# Patient Record
Sex: MALE | Race: WHITE | NOT HISPANIC OR LATINO | Employment: UNEMPLOYED | URBAN - METROPOLITAN AREA
[De-identification: names, ages, dates, MRNs, and addresses within clinical notes are randomized per-mention and may not be internally consistent; named-entity substitution may affect disease eponyms.]

---

## 2019-04-20 ENCOUNTER — HOSPITAL ENCOUNTER (EMERGENCY)
Facility: HOSPITAL | Age: 44
Discharge: HOME | End: 2019-04-20
Attending: EMERGENCY MEDICINE

## 2019-04-20 VITALS
BODY MASS INDEX: 25.73 KG/M2 | HEIGHT: 72 IN | OXYGEN SATURATION: 95 % | TEMPERATURE: 97.5 F | DIASTOLIC BLOOD PRESSURE: 83 MMHG | SYSTOLIC BLOOD PRESSURE: 128 MMHG | WEIGHT: 190 LBS | HEART RATE: 94 BPM | RESPIRATION RATE: 18 BRPM

## 2019-04-20 DIAGNOSIS — T40.1X1A HEROIN OVERDOSE (HCC): Primary | ICD-10-CM

## 2019-04-20 PROCEDURE — 99284 EMERGENCY DEPT VISIT MOD MDM: CPT | Performed by: EMERGENCY MEDICINE

## 2019-04-20 PROCEDURE — 93005 ELECTROCARDIOGRAM TRACING: CPT

## 2019-04-20 PROCEDURE — 99284 EMERGENCY DEPT VISIT MOD MDM: CPT

## 2019-04-20 RX ORDER — NALOXONE HYDROCHLORIDE 1 MG/ML
1 INJECTION PARENTERAL ONCE
Status: COMPLETED | OUTPATIENT
Start: 2019-04-20 | End: 2019-04-20

## 2019-04-20 RX ORDER — IBUPROFEN 600 MG/1
600 TABLET ORAL ONCE
Status: DISCONTINUED | OUTPATIENT
Start: 2019-04-20 | End: 2019-04-20 | Stop reason: HOSPADM

## 2019-04-20 RX ORDER — ONDANSETRON 2 MG/ML
INJECTION INTRAMUSCULAR; INTRAVENOUS
Status: DISCONTINUED
Start: 2019-04-20 | End: 2019-04-20 | Stop reason: HOSPADM

## 2019-04-20 RX ORDER — ONDANSETRON 2 MG/ML
1 INJECTION INTRAMUSCULAR; INTRAVENOUS ONCE
Status: COMPLETED | OUTPATIENT
Start: 2019-04-20 | End: 2019-04-20

## 2019-04-20 RX ORDER — ACETAMINOPHEN 325 MG/1
650 TABLET ORAL ONCE
Status: DISCONTINUED | OUTPATIENT
Start: 2019-04-20 | End: 2019-04-20 | Stop reason: HOSPADM

## 2019-04-22 LAB
ATRIAL RATE: 83 BPM
P AXIS: 56 DEGREES
PR INTERVAL: 142 MS
QRS AXIS: 56 DEGREES
QRSD INTERVAL: 96 MS
QT INTERVAL: 384 MS
QTC INTERVAL: 451 MS
T WAVE AXIS: 57 DEGREES
VENTRICULAR RATE: 83 BPM

## 2019-04-22 PROCEDURE — 93010 ELECTROCARDIOGRAM REPORT: CPT | Performed by: INTERNAL MEDICINE

## 2019-06-28 ENCOUNTER — HOSPITAL ENCOUNTER (EMERGENCY)
Facility: HOSPITAL | Age: 44
Discharge: HOME | End: 2019-06-29
Attending: EMERGENCY MEDICINE | Admitting: EMERGENCY MEDICINE

## 2019-06-28 DIAGNOSIS — F32.A DEPRESSION: Primary | ICD-10-CM

## 2019-06-28 LAB
AMPHETAMINES SERPL QL SCN: POSITIVE
BARBITURATES UR QL: NEGATIVE
BENZODIAZ UR QL: NEGATIVE
COCAINE UR QL: POSITIVE
ETHANOL EXG-MCNC: 0 MG/DL
METHADONE UR QL: NEGATIVE
OPIATES UR QL SCN: POSITIVE
PCP UR QL: NEGATIVE
THC UR QL: NEGATIVE

## 2019-06-28 PROCEDURE — 99284 EMERGENCY DEPT VISIT MOD MDM: CPT

## 2019-06-28 PROCEDURE — 80307 DRUG TEST PRSMV CHEM ANLYZR: CPT | Performed by: EMERGENCY MEDICINE

## 2019-06-28 PROCEDURE — 82075 ASSAY OF BREATH ETHANOL: CPT | Performed by: EMERGENCY MEDICINE

## 2019-06-28 RX ORDER — NICOTINE 21 MG/24HR
21 PATCH, TRANSDERMAL 24 HOURS TRANSDERMAL ONCE
Status: DISCONTINUED | OUTPATIENT
Start: 2019-06-28 | End: 2019-06-29 | Stop reason: HOSPADM

## 2019-06-28 RX ADMIN — NICOTINE 21 MG: 21 PATCH, EXTENDED RELEASE TRANSDERMAL at 20:22

## 2019-06-28 NOTE — ED NOTES
1 pt belonging bag and 1 red backpack placed in zone 5 med room on counter to the right of the sink d/t backpack being too large to fit in cabinets  Pt changed into paper scrubs and provided a urine sample at this time  Pt calm and cooperative, will continue to monitor via continual observation 1:1       Julee Javier  06/28/19 1947

## 2019-06-29 VITALS
TEMPERATURE: 98.5 F | DIASTOLIC BLOOD PRESSURE: 86 MMHG | SYSTOLIC BLOOD PRESSURE: 139 MMHG | WEIGHT: 165 LBS | BODY MASS INDEX: 22.38 KG/M2 | OXYGEN SATURATION: 96 % | HEART RATE: 82 BPM | RESPIRATION RATE: 18 BRPM

## 2019-06-29 PROCEDURE — 99283 EMERGENCY DEPT VISIT LOW MDM: CPT | Performed by: EMERGENCY MEDICINE

## 2019-06-29 NOTE — ED NOTES
Pt finished pre screen for [facility], pt attempted to contact number provided and was not connected to to right person, was told to call back in 10 minutes    Pt calm cooperative and offers no complaints     Yeny Love RN  06/29/19 3048

## 2019-06-29 NOTE — ED NOTES
Crisis Worker ([id number] [email or web address]) received phone call from Siomara Pettit from   [facility] who stated that there is a detox bed available at [facility] and asked if patient (Pt) could call there do complete a prescreen  CW gave Pt a phone and connected him to [facility] to complete a prescreen

## 2019-06-29 NOTE — ED NOTES
Pt provided  personal care items and orange juice    Pt awaiting call back to [facility] Cathy, RN  06/29/19 6496

## 2019-06-29 NOTE — ED NOTES
Pt stated that [facility] has no beds until Monday  Pt stated that he wants to be discharged  Pt stated that he is not suicidal and came in looking for help  CW offered pt to sign a 201 and go to inpatient mental health and then rehab and Pt declined and stated that he is not suicidal and wants to go home  CW talked with ED Drs who stated they are ok with Pt being discharged home  CW gave Pt discharge and drug and alcohol resources and told him that HOST will follow up with him when a detox bed becomes available  CW let a message on HOST voicemail asking for detox search to continue and reach Pt on cell phone as he has been discharged

## 2019-06-29 NOTE — ED PROVIDER NOTES
History  Chief Complaint   Patient presents with    Psychiatric Evaluation     Pt states he relapsed on heroin, states he is having crazy thoughts "making him want to kill himself"  States he has been thinking of jumping off of a bridge, but he would like to get help     26-year-old man history of cocaine abuse heroin abuse pride predominantly heroin  Has been using heroin for 20 years however he did quit he had a relapse 3 months ago and has been using daily since then most recently as today  Has a history of severe withdrawal symptoms  He has got kicked out of his house lost his family over this with recent relapse he is staying with random people random places  He says he has depressed because what happened to him he wants to check in for health a detox and rehab  He has thought about suicide he has a history of attempts with purposeful overdose  He states that he does not want to die but he is considered this again because he is embarrassed about what he has done  Has no homicidal ideation  His history seems to suggest that his predominant motor treatment should be rehab          None       History reviewed  No pertinent past medical history  History reviewed  No pertinent surgical history  History reviewed  No pertinent family history  I have reviewed and agree with the history as documented  Social History     Tobacco Use    Smoking status: Current Every Day Smoker     Packs/day: 0 50     Types: Cigarettes    Smokeless tobacco: Never Used   Substance Use Topics    Alcohol use: Not Currently     Comment: social    Drug use: Yes     Types: Heroin        Review of Systems   Constitutional: Negative for activity change, chills, diaphoresis, fatigue and fever  HENT: Negative for congestion, postnasal drip, rhinorrhea, sneezing, sore throat and trouble swallowing  Eyes: Negative for visual disturbance  Respiratory: Negative for cough, chest tightness, shortness of breath and wheezing  Cardiovascular: Negative for chest pain and leg swelling  Gastrointestinal: Negative for abdominal distention, abdominal pain, blood in stool, constipation, diarrhea, nausea and vomiting  Genitourinary: Negative for decreased urine volume, dysuria, flank pain, frequency, hematuria and urgency  Skin: Negative for color change and rash  Neurological: Negative for syncope, weakness, light-headedness and headaches  Psychiatric/Behavioral: Positive for suicidal ideas  Negative for confusion and sleep disturbance  All other systems reviewed and are negative  Physical Exam  ED Triage Vitals   Temperature Pulse Respirations Blood Pressure SpO2   06/28/19 1919 06/28/19 1925 06/28/19 1925 06/28/19 1925 06/28/19 1925   98 5 °F (36 9 °C) (!) 110 18 147/91 98 %      Temp Source Heart Rate Source Patient Position - Orthostatic VS BP Location FiO2 (%)   06/28/19 1919 06/28/19 1925 06/28/19 1925 06/28/19 1925 --   Oral Monitor Lying Right arm       Pain Score       06/28/19 1925       No Pain             Orthostatic Vital Signs  Vitals:    06/28/19 1925 06/29/19 0215 06/29/19 1013   BP: 147/91 132/71 139/86   Pulse: (!) 110 92 82   Patient Position - Orthostatic VS: Lying Lying Lying       Physical Exam   Constitutional: He is oriented to person, place, and time  He appears well-developed and well-nourished  No distress  HENT:   Head: Normocephalic and atraumatic  Nose: Nose normal    Eyes: Pupils are equal, round, and reactive to light  Conjunctivae and EOM are normal  No scleral icterus  Neck: Normal range of motion  Neck supple  No tracheal deviation present  Cardiovascular: Normal rate, regular rhythm, normal heart sounds and intact distal pulses  No murmur heard  Pulmonary/Chest: Effort normal and breath sounds normal  No stridor  No respiratory distress  He has no wheezes  Abdominal: Soft  Bowel sounds are normal  He exhibits no distension  There is no tenderness  There is no guarding  Musculoskeletal: Normal range of motion  He exhibits no edema, tenderness or deformity  Neurological: He is alert and oriented to person, place, and time  No cranial nerve deficit or sensory deficit  He exhibits normal muscle tone  Skin: Skin is warm and dry  Capillary refill takes less than 2 seconds  He is not diaphoretic  Psychiatric:   Patient tearful   Nursing note and vitals reviewed  ED Medications  Medications - No data to display    Diagnostic Studies  Results Reviewed     Procedure Component Value Units Date/Time    Rapid drug screen, urine [[id number]]  (Abnormal) Collected:  06/28/19 1942    Lab Status:  Final result Specimen:  Urine, Clean Catch Updated:  06/28/19 2013     Amph/Meth UR Positive     Barbiturate Ur Negative     Benzodiazepine Urine Negative     Cocaine Urine Positive     Methadone Urine Negative     Opiate Urine Positive     PCP Ur Negative     THC Urine Negative    Narrative:       Presumptive report  If requested, specimen will be sent to reference lab for confirmation  FOR MEDICAL PURPOSES ONLY  IF CONFIRMATION NEEDED PLEASE CONTACT THE LAB WITHIN 5 DAYS      Drug Screen Cutoff Levels:  AMPHETAMINE/METHAMPHETAMINES  1000 ng/mL  BARBITURATES     200 ng/mL  BENZODIAZEPINES     200 ng/mL  COCAINE      300 ng/mL  METHADONE      300 ng/mL  OPIATES      300 ng/mL  PHENCYCLIDINE     25 ng/mL  THC       50 ng/mL      POCT alcohol breath test [[id number]]  (Normal) Resulted:  06/28/19 1942    Lab Status:  Final result Updated:  06/28/19 1942     EXTBreath Alcohol 0 000                 No orders to display         Procedures  Procedures        ED Course                               MDM  Number of Diagnoses or Management Options  Depression:   Diagnosis management comments: Crisis consulted    Host evaluated    No available beds until Monday      Disposition  Final diagnoses:   Depression     Time reflects when diagnosis was documented in both MDM as applicable and the Disposition within this note     Time User Action Codes Description Comment    6/29/2019 10:39 AM Alvinochristen Lore Add [F32 9] Depression       ED Disposition     ED Disposition Condition Date/Time Comment    Discharge Stable Sat Jun 29, 2019 10:39 AM Miranda Cooper discharge to home/self care  MD Documentation      Most Recent Value   Sending MD Dr Tanika Harvey up With Specialties Details Why Contact Info Additional Information    [employer]  Schedule an appointment as soon as possible for a visit in 1 day  [address] [place] [address] Emergency Department Emergency Medicine Go to  If symptoms worsen, As needed [address] [place] [address] ED, [address], [address]          There are no discharge medications for this patient  No discharge procedures on file  ED Provider  Attending physically available and evaluated Miranda Cooper I managed the patient along with the ED Attending      Electronically Signed by         Linda Ovalles MD  06/29/19 0046

## 2019-06-29 NOTE — ED NOTES
HOST completed assessment with patient, who meets criteria for detox placement  Currently no detox beds available but HOST will continue to call for placement  HOST was concerned with patient's suicidal thoughts due to him stating if he were to leave he would not be safe  Spoke with Dr Marilou Lawson who is agreeable to hold patient in ED overnight for HOST to place tomorrow  Patient is agreeable with this plan       NICKI Kumar  06/28/19   [id number]

## 2019-06-29 NOTE — ED ATTENDING ATTESTATION
Shari Downs MD, saw and evaluated the patient  I have discussed the patient with the resident/non-physician practitioner and agree with the resident's/non-physician practitioner's findings, Plan of Care, and MDM as documented in the resident's/non-physician practitioner's note, except where noted  All available labs and Radiology studies were reviewed  I was present for key portions of any procedure(s) performed by the resident/non-physician practitioner and I was immediately available to provide assistance  At this point I agree with the current assessment done in the Emergency Department  I have conducted an independent evaluation of this patient a history and physical is as follows:      Critical Care Time  Procedures     44 yo male with hx of heroin and cocaine abuse, quit and relapsed with last use yesterday  Pt with si due to depression  Pt with no alcohol use  Pt with no hi, no hallucinations, no withdrawal symptoms  Vss, afebrile, lungs cta, rrr, abdomen soft nontender, no neuro deficits   Uds, bat, crisis, host

## 2019-06-29 NOTE — ED NOTES
Per CW and MD pt does not need continual observation at this time  Will continue to monitor via 4 random checks per hour        [facility]  06/28/19 2049

## 2019-06-29 NOTE — ED NOTES
Pt denies any SI at this time, pt states just needs rehab    [place] from crisis aware and physician aware     Chhaya Harper, SHANA  06/29/19 8818

## 2019-06-29 NOTE — ED NOTES
HOST contacted to request assessment for detox placement; someone should be out shortly to meet with patient       NICKI Vinson  06/28/19   0063

## 2019-06-29 NOTE — ED NOTES
Patient breakfast tray is ordered at this time- finger foods     Lili Boudreaux, SHANA  06/29/19 5615

## 2019-06-29 NOTE — ED NOTES
Pt is a 45 y o  male who was brought to the ED requesting detox placement for Heroin use  Patient was sober for 2 5 years prior to relapsing about 3 months ago  Since that time, patient has been using via IV daily; he reports use of at least 5 bags a day but typically about 7  Patient occassionally uses cocaine or meth, but reports this is when he is high and it is around  Patient has been in detox/rehab placement in the past  He denies any outpatient or supports  Patient states that for about two weeks he has been bouncing around with friends, but is currently homeless  He has lost a lot of supports once they have learned that he relapsed  Patient's boss found out as well and told him he cannot work until he's sober due to being a liability  Patient appears sincere in desire to stop using and recognizes that he needs to do so before he loses his job entirely and then has nothing  Patient states that for the past few weeks he has been having suicidal thoughts, however the past few days they have become so severe that he doesn't feel safe being alone  Patient reports having a plan to overdose, however denies any intent or desire to do so  Patient is tearful and describes that he doesn't want to hurt himself, he just feels hopeless and doesn't want to use any more  Patient talked with some friends about these feelings and was recommended to come to the ED because he didn't know where else to go to get help  Patient has no prior mental health treatment history  Patient denies any homicidal ideations and auditory/visual hallucinations  Patient is agreeable to meeting with HOST for possible detox placement        Chief Complaint   Patient presents with    Psychiatric Evaluation     Pt states he relapsed on heroin, states he is having crazy thoughts "making him want to kill himself"  States he has been thinking of jumping off of a bridge, but he would like to get help     Intake Assessment completed, Safety risk Assessment completed    Delfin Victor [place]  06/28/19 2047

## 2019-08-28 ENCOUNTER — HOSPITAL ENCOUNTER (EMERGENCY)
Facility: HOSPITAL | Age: 44
Discharge: HOME | End: 2019-08-28
Attending: EMERGENCY MEDICINE | Admitting: EMERGENCY MEDICINE

## 2019-08-28 VITALS
BODY MASS INDEX: 24.41 KG/M2 | WEIGHT: 180 LBS | SYSTOLIC BLOOD PRESSURE: 115 MMHG | OXYGEN SATURATION: 97 % | TEMPERATURE: 97.4 F | RESPIRATION RATE: 17 BRPM | DIASTOLIC BLOOD PRESSURE: 79 MMHG | HEART RATE: 90 BPM

## 2019-08-28 DIAGNOSIS — T40.1X1A HEROIN OVERDOSE (HCC): ICD-10-CM

## 2019-08-28 DIAGNOSIS — F11.10 HEROIN ABUSE (HCC): Primary | ICD-10-CM

## 2019-08-28 PROCEDURE — 99284 EMERGENCY DEPT VISIT MOD MDM: CPT

## 2019-08-28 PROCEDURE — 99282 EMERGENCY DEPT VISIT SF MDM: CPT | Performed by: EMERGENCY MEDICINE

## 2019-08-28 NOTE — ED PROVIDER NOTES
History  Chief Complaint   Patient presents with    Heroin Overdose - Accidental     Brought in via EMS  Found unresponsive given 4mg Narcan nasally, and 2mg IV  Patient A&O on arrival  Admits to shooting up 1 bag of heroine today  21-year-old gentleman presents status post accidental overdose of heroin  Reports that he is his long history of heroin abuse and had been clean for quite some time  He recently back using and reports injecting one bag today  He was in a band from and was found unresponsive  He was given 4 mg Narcan intranasally followed by 2 mg IV by EMS  After this patient has remained awake, alert, oriented  Denies any concurrent drug or alcohol abuse  He denies any past medical history or other acute issues or concerns  Declines any option of rehab at this point time  Overdose - Accidental   Ingested substance:  Illicit drugs  Suspected agents: heroin  Witnesses present: no    Incident location: public bathroom  Context: recreational    Associated symptoms: altered mental status (resolved), lethargy (resolved) and unresponsiveness (resolved)    Associated symptoms: no abdominal pain, no agitation, no anxiety, no chest pain, no cough, no diaphoresis, no diarrhea, no headaches, no nausea, no shortness of breath, no slurred speech, no visual changes and no vomiting        None       History reviewed  No pertinent past medical history  History reviewed  No pertinent surgical history  History reviewed  No pertinent family history  I have reviewed and agree with the history as documented      Social History     Tobacco Use    Smoking status: Current Every Day Smoker     Packs/day: 0 50     Types: Cigarettes    Smokeless tobacco: Never Used   Substance Use Topics    Alcohol use: Not Currently     Comment: social    Drug use: Yes     Types: Heroin     Comment: used 1 bag today        Review of Systems   Constitutional: Negative for activity change, chills, diaphoresis, fatigue and fever  HENT: Negative  Negative for congestion, postnasal drip, rhinorrhea, sinus pain, sore throat and trouble swallowing  Eyes: Negative  Respiratory: Negative  Negative for cough and shortness of breath  Cardiovascular: Negative for chest pain  Gastrointestinal: Negative for abdominal pain, constipation, diarrhea, nausea and vomiting  Endocrine: Negative  Genitourinary: Negative  Musculoskeletal: Negative  Negative for arthralgias, back pain and myalgias  Skin: Negative  Allergic/Immunologic: Negative  Neurological: Negative  Negative for headaches  Hematological: Negative  Psychiatric/Behavioral: Negative for agitation and suicidal ideas  Physical Exam  Physical Exam   Constitutional: He is oriented to person, place, and time  He appears well-developed and well-nourished  No distress  HENT:   Head: Normocephalic  Nose: Nose normal    Mouth/Throat: Oropharynx is clear and moist    Eyes: Pupils are equal, round, and reactive to light  Conjunctivae are normal    Pupils are 4-5 mm bilaterally   Neck: Neck supple  Cardiovascular: Normal rate, regular rhythm and normal heart sounds  Pulmonary/Chest: Effort normal and breath sounds normal  No respiratory distress  Abdominal: Soft  Bowel sounds are normal  He exhibits no distension  There is no tenderness  There is no guarding  Musculoskeletal: Normal range of motion  He exhibits no edema  Neurological: He is alert and oriented to person, place, and time  Skin: Skin is warm and dry  Capillary refill takes less than 2 seconds  He is not diaphoretic  Psychiatric: He has a normal mood and affect  His behavior is normal    Nursing note and vitals reviewed        Vital Signs  ED Triage Vitals [08/28/19 1627]   Temp Pulse Respirations Blood Pressure SpO2   -- 96 21 125/77 98 %      Temp src Heart Rate Source Patient Position - Orthostatic VS BP Location FiO2 (%)   -- Monitor Lying Left arm --      Pain Score       No Pain           Vitals:    08/28/19 1627   BP: 125/77   Pulse: 96   Patient Position - Orthostatic VS: Lying         Visual Acuity      ED Medications  Medications - No data to display    Diagnostic Studies  Results Reviewed     None                 No orders to display              Procedures  Procedures       ED Course                               MDM  Number of Diagnoses or Management Options  Heroin abuse [facility]):   Heroin overdose [facility]):   Diagnosis management comments: 61-year-old gentleman presents status post accidental overdose and it is the present today his history chronic heroin abuse declines any option rehab at this point  States he awakened this his own discharge patient fully awake received the field  Denies any acute issues or concerns  He has advised against the use illicit drugs or follow-up as necessary      Disposition  Final diagnoses:   Heroin abuse ([facility] [address] )   Heroin overdose ([facility] [address] )     Time reflects when diagnosis was documented in both MDM as applicable and the Disposition within this note     Time User Action Codes Description Comment    8/28/2019  4:27 PM Dunn Books Add [F11 10] Heroin abuse ([facility] [address] )     8/28/2019  4:27 PM Jeannie Barber, [address] Heroin overdose [facility])       ED Disposition     ED Disposition Condition Date/Time Comment    Discharge Stable Wed Aug 28, 2019  4:27 PM Sol Thornton discharge to home/self care  Follow-up Information    None         Patient's Medications    No medications on file     No discharge procedures on file      ED Provider  Electronically Signed by           Arabella Guzmán DO  08/28/19 0009

## 2019-09-17 ENCOUNTER — HOSPITAL ENCOUNTER (EMERGENCY)
Facility: HOSPITAL | Age: 44
Discharge: HOME | End: 2019-09-17
Attending: EMERGENCY MEDICINE | Admitting: EMERGENCY MEDICINE
Payer: COMMERCIAL

## 2019-09-17 ENCOUNTER — APPOINTMENT (EMERGENCY)
Dept: RADIOLOGY | Facility: HOSPITAL | Age: 44
End: 2019-09-17
Payer: COMMERCIAL

## 2019-09-17 VITALS
RESPIRATION RATE: 16 BRPM | OXYGEN SATURATION: 98 % | WEIGHT: 189.15 LBS | DIASTOLIC BLOOD PRESSURE: 71 MMHG | HEART RATE: 66 BPM | TEMPERATURE: 98.7 F | SYSTOLIC BLOOD PRESSURE: 109 MMHG | BODY MASS INDEX: 25.65 KG/M2

## 2019-09-17 DIAGNOSIS — T40.1X1A ACCIDENTAL OVERDOSE OF HEROIN, INITIAL ENCOUNTER (HCC): Primary | ICD-10-CM

## 2019-09-17 PROCEDURE — 99285 EMERGENCY DEPT VISIT HI MDM: CPT | Performed by: EMERGENCY MEDICINE

## 2019-09-17 PROCEDURE — 99284 EMERGENCY DEPT VISIT MOD MDM: CPT

## 2019-09-17 PROCEDURE — 71046 X-RAY EXAM CHEST 2 VIEWS: CPT

## 2019-09-17 PROCEDURE — 93005 ELECTROCARDIOGRAM TRACING: CPT

## 2019-09-17 RX ORDER — NALOXONE HYDROCHLORIDE 1 MG/ML
3 INJECTION PARENTERAL ONCE
Status: COMPLETED | OUTPATIENT
Start: 2019-09-17 | End: 2019-09-17

## 2019-09-17 RX ORDER — ONDANSETRON 4 MG/1
4 TABLET, ORALLY DISINTEGRATING ORAL ONCE
Status: COMPLETED | OUTPATIENT
Start: 2019-09-17 | End: 2019-09-17

## 2019-09-17 RX ADMIN — ONDANSETRON 4 MG: 4 TABLET, ORALLY DISINTEGRATING ORAL at 13:34

## 2019-09-17 NOTE — DISCHARGE INSTRUCTIONS
Please call [facility]ink to establish care and seek treatment  Please return to ED if suicidal/homicidal thoughts

## 2019-09-17 NOTE — ED PROVIDER NOTES
History  Chief Complaint   Patient presents with    Heroin Overdose - Accidental     Pt homeless, used 1 bag heroin, found unresponsive, given narcan  Pt denies complaints     Jana Gosselin is a 45y o  year old Male with PMH of heroin use presenting to the [facility] ED for heroin overdose  Patient notes he uses 2 bags of heroin daily but took 1 bag this AM from different supplier  EMS personnel found the patient unresponsive  Patient received 6mg naloxone intranasal by EMS after which patient regained consciousness  Upon presentation to the ER, patient had 1 episode of vomiting and currently states he feels nauseous  Patient denies fever/chills, chest pain, shortness of breath  Patient denies bowel/bladder incontinence  Patient denies midline back pain  Patient denies SI/HI presently  History provided by:  Patient   used: No    Overdose - Accidental   Ingested substance: heroin  Time since incident:  1 hour  Ingestion amount:  1 bag  Witnesses present: no    Called poison control: no    Incident location:  Unable to specify  Context: recreational    Context: not suicide attempt    Associated symptoms: altered mental status, nausea, unresponsiveness and vomiting    Associated symptoms: no abdominal pain, no agitation, no chest pain, no cough, no diarrhea, no headaches and no shortness of breath        None       Past Medical History:   Diagnosis Date    Hepatitis C        History reviewed  No pertinent surgical history  History reviewed  No pertinent family history  I have reviewed and agree with the history as documented      Social History     Tobacco Use    Smoking status: Current Every Day Smoker     Packs/day: 1 00     Types: Cigarettes    Smokeless tobacco: Never Used   Substance Use Topics    Alcohol use: Not Currently     Comment: social    Drug use: Yes     Frequency: 7 0 times per week     Types: Heroin     Comment: about 2 bags        Review of Systems   Constitutional: Negative for chills, fatigue and fever  HENT: Negative for congestion, nosebleeds and rhinorrhea  Eyes: Negative for visual disturbance  Respiratory: Negative for cough, chest tightness, shortness of breath and wheezing  Cardiovascular: Negative for chest pain, palpitations and leg swelling  Gastrointestinal: Positive for nausea and vomiting  Negative for abdominal distention, abdominal pain and diarrhea  Endocrine: Negative for polyuria  Genitourinary: Negative for dysuria, flank pain and hematuria  Musculoskeletal: Negative for arthralgias, gait problem and joint swelling  Skin: Negative for rash  Neurological: Negative for seizures, syncope, weakness, light-headedness and headaches  Hematological: Does not bruise/bleed easily  Psychiatric/Behavioral: Negative for agitation  All other systems reviewed and are negative  Physical Exam  ED Triage Vitals   Temperature Pulse Respirations Blood Pressure SpO2   09/17/19 1247 09/17/19 1233 09/17/19 1233 09/17/19 1233 09/17/19 1233   98 7 °F (37 1 °C) 104 16 144/93 100 %      Temp Source Heart Rate Source Patient Position - Orthostatic VS BP Location FiO2 (%)   09/17/19 1247 09/17/19 1233 09/17/19 1233 09/17/19 1233 --   Oral Monitor Sitting Right arm       Pain Score       09/17/19 1233       No Pain             Orthostatic Vital Signs  Vitals:    09/17/19 1233 09/17/19 1247 09/17/19 1330 09/17/19 1409   BP: 144/93  116/70 109/71   Pulse: 104 88 76 66   Patient Position - Orthostatic VS: Sitting  Lying Lying       Physical Exam   Constitutional: He is oriented to person, place, and time  He appears well-developed and well-nourished  HENT:   Head: Normocephalic and atraumatic  Eyes: Pupils are equal, round, and reactive to light  Neck: Neck supple  Cardiovascular: Normal rate and regular rhythm  No murmur heard  Pulmonary/Chest: Effort normal  No respiratory distress  He has no rales     Abdominal: Soft  Bowel sounds are normal  There is no tenderness  There is no rebound and no guarding  Musculoskeletal:        Cervical back: He exhibits no tenderness and no bony tenderness  Thoracic back: He exhibits no tenderness and no bony tenderness  Lumbar back: He exhibits no tenderness and no bony tenderness  Neurological: He is alert and oriented to person, place, and time  Skin: Skin is warm  Capillary refill takes less than 2 seconds  Psychiatric: He has a normal mood and affect  Nursing note and vitals reviewed  ED Medications  Medications   naloxone (FOR EMS ONLY) [facility]) 2 MG/2ML injection 6 mg (0 mg Does not apply Given to EMS 9/17/19 1321)   ondansetron (ZOFRAN-ODT) dispersible tablet 4 mg (4 mg Oral Given 9/17/19 1334)       Diagnostic Studies  Results Reviewed     None                 XR chest 2 views   Final Result by Alicja Tripp MD (09/17 1526)      No acute cardiopulmonary disease  Workstation performed: [id number]               Procedures  Procedures        ED Course  ED Course as of Sep 18 1435   Tue Sep 17, 2019   1326 Procedure Note: EKG  Date/Time: 09/17/19 1:27 PM   Interpreted by: Rhona Rivera DO  Indications / Diagnosis: overdose  ECG reviewed by me, the ED Provider: yes   The EKG demonstrates:  Rhythm: Normal sinus @ 92  Intervals: Normal RI and QT intervals  Axis: Left axis  LAFB   QRS/Blocks: Normal QRS  ST Changes: No acute ST Changes, no STD/CARYN             1328 Patient declines social and treatment resources  MDM  Number of Diagnoses or Management Options  Accidental overdose of heroin, initial encounter [facility]):   Diagnosis management comments: 77-year-old male with history of substance abuse presenting for heroin overdose  Patient nauseous in the emergency department for which she received Zofran  Patient observed for 2 hours during which respiratory status remained stable    Denies back pain fevers/chills, bowel/bladder incontinence, saddle anesthesia  Patient was offered resources for homelessness and substance abuse, which he declined  Patient left without discharge paperwork       Amount and/or Complexity of Data Reviewed  Tests in the radiology section of CPT®: ordered and reviewed    Patient Progress  Patient progress: stable      Disposition  Final diagnoses:   Accidental overdose of heroin, initial encounter [facility])     Time reflects when diagnosis was documented in both MDM as applicable and the Disposition within this note     Time User Action Codes Description Comment    9/17/2019  2:42 PM Yasmany Mckenzie Add [T40 1X1A] Accidental overdose of heroin, initial encounter [facility])       ED Disposition     ED Disposition Condition Date/Time Comment    Discharge Stable Tue Sep 17, 2019  2:42 PM Eric Lock discharge to home/self care  Follow-up Information     Follow up With Specialties Details Why Contact Info    Infolink  Schedule an appointment as soon as possible for a visit  To establish care  [phone]            There are no discharge medications for this patient  No discharge procedures on file  ED Provider  Attending physically available and evaluated Eric Lock I managed the patient along with the ED Attending      Electronically Signed by Sharla Wilkinson, Mala[address], DO  09/18/19 4467

## 2019-09-18 LAB
ATRIAL RATE: 92 BPM
P AXIS: 66 DEGREES
PR INTERVAL: 136 MS
QRS AXIS: -61 DEGREES
QRSD INTERVAL: 94 MS
QT INTERVAL: 350 MS
QTC INTERVAL: 432 MS
T WAVE AXIS: 58 DEGREES
VENTRICULAR RATE: 92 BPM

## 2019-09-18 PROCEDURE — 93010 ELECTROCARDIOGRAM REPORT: CPT | Performed by: INTERNAL MEDICINE

## 2021-04-13 ENCOUNTER — HOSPITAL ENCOUNTER (EMERGENCY)
Facility: HOSPITAL | Age: 46
Discharge: HOME | End: 2021-04-13
Attending: EMERGENCY MEDICINE
Payer: COMMERCIAL

## 2021-04-13 VITALS
SYSTOLIC BLOOD PRESSURE: 153 MMHG | HEIGHT: 72 IN | BODY MASS INDEX: 25.6 KG/M2 | TEMPERATURE: 97.1 F | HEART RATE: 104 BPM | OXYGEN SATURATION: 95 % | WEIGHT: 189 LBS | DIASTOLIC BLOOD PRESSURE: 95 MMHG | RESPIRATION RATE: 20 BRPM

## 2021-04-13 DIAGNOSIS — F19.10 DRUG ABUSE (HCC): Primary | ICD-10-CM

## 2021-04-13 PROCEDURE — 99283 EMERGENCY DEPT VISIT LOW MDM: CPT | Performed by: EMERGENCY MEDICINE

## 2021-04-13 PROCEDURE — 99283 EMERGENCY DEPT VISIT LOW MDM: CPT

## 2021-04-13 NOTE — ED PROVIDER NOTES
History  Chief Complaint   Patient presents with    Drug Problem     brought in by EMS, used heroine this AM and not sure if it was laced  [employer] called EMS to pickup since pt was acting bizarre, but is very cooperative at this time     74-year-old gentleman presents for evaluation  Had a run-in with police earlier today after using heroin  Was found in the [employer] parking lot and police reported that he was acting somewhat strangely  Patient admits to using additional heroin but is unsure was laced with something else  EMS reports that based on his behavior on scene that there could have been meth and is heroin  Denies intentional use of any other drugs recently  Denies any thoughts of self-harm or suicide and declines any option of rehab at this point time  Currently the patient denies any acute issues or concerns states that he feels fine  Drug Problem  Severity:  Moderate  Onset quality:  Gradual  Timing:  Intermittent  Progression:  Waxing and waning  Chronicity:  Chronic  Relieved by:  Not using drugs  Worsened by:  Using drugs  Ineffective treatments:  None tried  Associated symptoms: no abdominal pain, no chest pain, no congestion, no cough, no diarrhea, no fatigue, no fever, no headaches, no loss of consciousness, no myalgias, no nausea, no rash, no rhinorrhea, no shortness of breath, no sore throat, no vomiting and no wheezing        None       Past Medical History:   Diagnosis Date    Hepatitis C        History reviewed  No pertinent surgical history  History reviewed  No pertinent family history  I have reviewed and agree with the history as documented      E-Cigarette/Vaping     E-Cigarette/Vaping Substances     Social History     Tobacco Use    Smoking status: Current Every Day Smoker     Packs/day: 1 00     Types: Cigarettes    Smokeless tobacco: Never Used   Substance Use Topics    Alcohol use: Not Currently     Comment: social    Drug use: Yes     Frequency: 7 0 times per week Types: Heroin     Comment: about 2 bags       Review of Systems   Constitutional: Negative for activity change, chills, fatigue and fever  HENT: Negative  Negative for congestion, postnasal drip, rhinorrhea, sinus pain, sore throat and trouble swallowing  Eyes: Negative  Respiratory: Negative  Negative for cough, shortness of breath and wheezing  Cardiovascular: Negative for chest pain  Gastrointestinal: Negative for abdominal pain, constipation, diarrhea, nausea and vomiting  Endocrine: Negative  Genitourinary: Negative  Musculoskeletal: Negative  Negative for arthralgias, back pain and myalgias  Skin: Negative  Negative for rash  Allergic/Immunologic: Negative  Neurological: Negative  Negative for loss of consciousness and headaches  Hematological: Negative  Psychiatric/Behavioral: Negative  Physical Exam  Physical Exam  Vitals signs and nursing note reviewed  Constitutional:       General: He is not in acute distress  Appearance: Normal appearance  He is well-developed  He is not ill-appearing, toxic-appearing or diaphoretic  HENT:      Head: Normocephalic and atraumatic  Right Ear: External ear normal       Left Ear: External ear normal       Nose: Nose normal  No congestion  Mouth/Throat:      Mouth: Mucous membranes are moist       Pharynx: Oropharynx is clear  Eyes:      Conjunctiva/sclera: Conjunctivae normal       Pupils: Pupils are equal, round, and reactive to light  Neck:      Musculoskeletal: Neck supple  No neck rigidity  Cardiovascular:      Rate and Rhythm: Normal rate and regular rhythm  Heart sounds: Normal heart sounds  Pulmonary:      Effort: Pulmonary effort is normal  No respiratory distress  Breath sounds: Normal breath sounds  Abdominal:      General: Bowel sounds are normal  There is no distension  Palpations: Abdomen is soft  Tenderness: There is no abdominal tenderness  There is no guarding  Musculoskeletal: Normal range of motion  Right lower leg: No edema  Left lower leg: No edema  Skin:     General: Skin is warm and dry  Capillary Refill: Capillary refill takes less than 2 seconds  Neurological:      General: No focal deficit present  Mental Status: He is alert and oriented to person, place, and time  Sensory: No sensory deficit  Motor: No weakness  Coordination: Coordination normal    Psychiatric:         Attention and Perception: Attention and perception normal          Mood and Affect: Mood normal          Speech: Speech normal          Behavior: Behavior normal  Behavior is cooperative  Thought Content: Thought content normal          Judgment: Judgment normal          Vital Signs  ED Triage Vitals [04/13/21 0945]   Temperature Pulse Respirations Blood Pressure SpO2   (!) 97 1 °F (36 2 °C) 104 20 153/95 95 %      Temp Source Heart Rate Source Patient Position - Orthostatic VS BP Location FiO2 (%)   Tympanic -- Sitting Left arm --      Pain Score       No Pain           Vitals:    04/13/21 0945   BP: 153/95   Pulse: 104   Patient Position - Orthostatic VS: Sitting         Visual Acuity      ED Medications  Medications - No data to display    Diagnostic Studies  Results Reviewed     None                 No orders to display              Procedures  Procedures         ED Course                                           MDM  Number of Diagnoses or Management Options  Drug abuse [facility]):   Diagnosis management comments: 46-year-old male presents with no complaints but hx of drug use  He is offering no complaints  [facility] requesting the [facility] remained hears think taking discussed the  Patient was able tolerate liquids and continued offer no acute medical concerns/complaints  Was discharged into the custody of police        Disposition  Final diagnoses:   Drug abuse [facility])     Time reflects when diagnosis was documented in both MDM as applicable and the Disposition within this note     Time User Action Codes Description Comment    4/13/2021  9:50 AM Ankit Barber Add [F19 10] Drug abuse [facility])       ED Disposition     ED Disposition Condition Date/Time Comment    Discharge Stable Tue Apr 13, 2021  9:50 AM Aurora Leone discharge to home/self care  Follow-up Information    None         Patient's Medications    No medications on file     No discharge procedures on file      PDMP Review     None          ED Provider  Electronically Signed by           Caro Horta DO  04/13/21 1019

## 2022-06-16 NOTE — ED NOTES
Dr Valentino Filter and med student at bedside for pt eval at this time        Jonna Sharma  06/28/19 1953 The risks, benefits, and alternatives to surgery were discussed. The patient elects to proceed with surgery- SENT TO [facility].

## 2022-08-20 ENCOUNTER — HOSPITAL ENCOUNTER (EMERGENCY)
Facility: HOSPITAL | Age: 47
Discharge: HOME | End: 2022-08-20
Attending: EMERGENCY MEDICINE

## 2022-08-20 VITALS
OXYGEN SATURATION: 98 % | SYSTOLIC BLOOD PRESSURE: 130 MMHG | RESPIRATION RATE: 16 BRPM | DIASTOLIC BLOOD PRESSURE: 86 MMHG | TEMPERATURE: 97.5 F | HEART RATE: 83 BPM

## 2022-08-20 DIAGNOSIS — T40.1X1A ACCIDENTAL OVERDOSE OF HEROIN, INITIAL ENCOUNTER (HCC): Primary | ICD-10-CM

## 2022-08-20 PROCEDURE — 93005 ELECTROCARDIOGRAM TRACING: CPT

## 2022-08-20 PROCEDURE — 99284 EMERGENCY DEPT VISIT MOD MDM: CPT | Performed by: EMERGENCY MEDICINE

## 2022-08-20 PROCEDURE — 99284 EMERGENCY DEPT VISIT MOD MDM: CPT

## 2022-08-20 RX ORDER — NALOXONE HYDROCHLORIDE 1 MG/ML
1 INJECTION PARENTERAL ONCE
Status: COMPLETED | OUTPATIENT
Start: 2022-08-20 | End: 2022-08-20

## 2022-08-20 NOTE — ED PROVIDER NOTES
History  Chief Complaint   Patient presents with    Overdose - Accidental     Patient states used half a bag of heroin  Per EMS gave   8 narcan via IO  Denies CP, SOB  Only complaint is headache  This is a 68-year-old male who presents with heroin overdose  Patient was found by a bystander at a [address] [place] [address]  Patient was unconscious in the bathroom  EMS was called  When they arrived, he was given a total of 8 mg of Narcan via IO with good response  Patient states that he used to have a heroin addiction  States that this is only the 2nd time he used this year  He does inject  Currently, the patient has no complaints  None       Past Medical History:   Diagnosis Date    Hepatitis C        History reviewed  No pertinent surgical history  History reviewed  No pertinent family history  I have reviewed and agree with the history as documented  E-Cigarette/Vaping     E-Cigarette/Vaping Substances     Social History     Tobacco Use    Smoking status: Current Every Day Smoker     Packs/day: 1 00     Types: Cigarettes    Smokeless tobacco: Never Used   Substance Use Topics    Alcohol use: Not Currently     Comment: social    Drug use: Yes     Frequency: 7 0 times per week     Types: Heroin     Comment: about 2 bags       Review of Systems   Constitutional: Negative for chills, fatigue and fever  HENT: Negative for rhinorrhea, sore throat and trouble swallowing  Eyes: Negative for photophobia and visual disturbance  Respiratory: Negative for cough, chest tightness and shortness of breath  Cardiovascular: Negative for chest pain, palpitations and leg swelling  Gastrointestinal: Negative for abdominal pain, blood in stool, diarrhea, nausea and vomiting  Endocrine: Negative for polyuria  Genitourinary: Negative for dysuria, flank pain and hematuria  Musculoskeletal: Negative for back pain and neck pain  Skin: Negative for color change and rash     Allergic/Immunologic: Negative for immunocompromised state  Neurological: Negative for dizziness, weakness, light-headedness, numbness and headaches  All other systems reviewed and are negative  Physical Exam  Physical Exam  Constitutional:       General: He is not in acute distress  Appearance: Normal appearance  He is well-developed  Comments: Diaphoretic  HENT:      Mouth/Throat:      Pharynx: Uvula midline  Eyes:      General: Lids are normal       Conjunctiva/sclera: Conjunctivae normal       Pupils: Pupils are equal, round, and reactive to light  Neck:      Thyroid: No thyroid mass or thyromegaly  Trachea: Trachea normal    Cardiovascular:      Rate and Rhythm: Regular rhythm  Tachycardia present  Heart sounds: Normal heart sounds  No murmur heard  Pulmonary:      Effort: Pulmonary effort is normal       Breath sounds: Normal breath sounds  Abdominal:      General: Bowel sounds are normal       Palpations: Abdomen is soft  Tenderness: There is no abdominal tenderness  There is no guarding or rebound  Negative signs include Horta's sign  Musculoskeletal:      Comments: Injection site of right antecubital fossa without evidence of infection  Skin:     General: Skin is warm and dry  Neurological:      Mental Status: He is alert  Psychiatric:         Speech: Speech normal          Behavior: Behavior normal  Behavior is cooperative  Thought Content:  Thought content normal          Vital Signs  ED Triage Vitals [08/20/22 0300]   Temperature Pulse Respirations Blood Pressure SpO2   97 5 °F (36 4 °C) 99 16 147/95 95 %      Temp Source Heart Rate Source Patient Position - Orthostatic VS BP Location FiO2 (%)   Oral Monitor Lying Left arm --      Pain Score       No Pain           Vitals:    08/20/22 0300   BP: 147/95   Pulse: 99   Patient Position - Orthostatic VS: Lying         Visual Acuity      ED Medications  Medications   naloxone (FOR EMS ONLY) (NARCAN) 2 MG/2ML injection 2 mg (0 mg Does not apply Given to EMS 8/20/22 0306)       Diagnostic Studies  Results Reviewed     None                 No orders to display              Procedures  ECG 12 Lead Documentation Only    Date/Time: 8/20/2022 3:06 AM  Performed by: Ian Yan MD  Authorized by: Ian Yan MD     ECG reviewed by me, the ED Provider: yes    Patient location:  ED  Previous ECG:     Previous ECG:  Compared to current    Comparison ECG info:  9/17/19    Similarity:  No change    Comparison to cardiac monitor: Yes    Interpretation:     Interpretation: normal    Rate:     ECG rate:  99    ECG rate assessment: normal    Rhythm:     Rhythm: sinus rhythm    Ectopy:     Ectopy: none    QRS:     QRS axis:  Normal    QRS intervals:  Normal  Conduction:     Conduction: normal    ST segments:     ST segments:  Normal  T waves:     T waves: normal               ED Course                                             MDM  Number of Diagnoses or Management Options  Diagnosis management comments: Patient currently alert and oriented x4 with a GCS 15  Will monitor in the emergency department  Disposition  Final diagnoses:   Accidental overdose of heroin, initial encounter [facility])     Time reflects when diagnosis was documented in both MDM as applicable and the Disposition within this note     Time User Action Codes Description Comment    8/20/2022  4:02 AM Silverio Montano Add [T40 1X1A] Accidental overdose of heroin, initial encounter [facility])       ED Disposition     ED Disposition   Discharge    Condition   Stable    Date/Time   Sat Aug 20, 2022  4:02 AM    Comment   Theresa Davenport discharge to home/self care                 Follow-up Information     Follow up With Specialties Details Why Contact Info Additional [address] [facility] Emergency Department Emergency Medicine Go to  If symptoms worsen [place] [phone]  [address] [facility] Emergency Department, [facility] Joshua Hurley [place] [facility] , [place], [address] [facility], [address]          Patient's Medications    No medications on file       No discharge procedures on file      PDMP Review     None          ED Provider  Electronically Signed by           Donnie Edmonds MD  08/20/22 4446

## 2022-08-21 LAB
ATRIAL RATE: 101 BPM
P AXIS: 64 DEGREES
PR INTERVAL: 128 MS
QRS AXIS: 60 DEGREES
QRSD INTERVAL: 88 MS
QT INTERVAL: 312 MS
QTC INTERVAL: 400 MS
T WAVE AXIS: 51 DEGREES
VENTRICULAR RATE: 99 BPM

## 2022-08-21 PROCEDURE — 93010 ELECTROCARDIOGRAM REPORT: CPT | Performed by: INTERNAL MEDICINE

## 2022-09-08 ENCOUNTER — HOSPITAL ENCOUNTER (EMERGENCY)
Facility: HOSPITAL | Age: 47
Discharge: HOME | End: 2022-09-08
Attending: EMERGENCY MEDICINE

## 2022-09-08 VITALS
DIASTOLIC BLOOD PRESSURE: 84 MMHG | SYSTOLIC BLOOD PRESSURE: 152 MMHG | OXYGEN SATURATION: 97 % | RESPIRATION RATE: 19 BRPM | HEART RATE: 98 BPM | TEMPERATURE: 98.8 F

## 2022-09-08 DIAGNOSIS — T50.901A OVERDOSE: Primary | ICD-10-CM

## 2022-09-08 LAB
ALBUMIN SERPL BCP-MCNC: 3.9 G/DL (ref 3.5–5)
ALP SERPL-CCNC: 58 U/L (ref 46–116)
ALT SERPL W P-5'-P-CCNC: 34 U/L (ref 12–78)
AMPHETAMINES SERPL QL SCN: POSITIVE
ANION GAP SERPL CALCULATED.3IONS-SCNC: 13 MMOL/L (ref 4–13)
AST SERPL W P-5'-P-CCNC: 34 U/L (ref 5–45)
BARBITURATES UR QL: NEGATIVE
BASOPHILS # BLD AUTO: 0.04 THOUSANDS/ΜL (ref 0–0.1)
BASOPHILS NFR BLD AUTO: 1 % (ref 0–1)
BENZODIAZ UR QL: NEGATIVE
BILIRUB SERPL-MCNC: 0.45 MG/DL (ref 0.2–1)
BUN SERPL-MCNC: 16 MG/DL (ref 5–25)
CALCIUM SERPL-MCNC: 9.2 MG/DL (ref 8.3–10.1)
CHLORIDE SERPL-SCNC: 106 MMOL/L (ref 96–108)
CO2 SERPL-SCNC: 25 MMOL/L (ref 21–32)
COCAINE UR QL: POSITIVE
CREAT SERPL-MCNC: 1.24 MG/DL (ref 0.6–1.3)
EOSINOPHIL # BLD AUTO: 0.27 THOUSAND/ΜL (ref 0–0.61)
EOSINOPHIL NFR BLD AUTO: 4 % (ref 0–6)
ERYTHROCYTE [DISTWIDTH] IN BLOOD BY AUTOMATED COUNT: 13.5 % (ref 11.6–15.1)
ETHANOL SERPL-MCNC: <3 MG/DL (ref 0–3)
GFR SERPL CREATININE-BSD FRML MDRD: 69 ML/MIN/1.73SQ M
GLUCOSE SERPL-MCNC: 104 MG/DL (ref 65–140)
HCT VFR BLD AUTO: 47.7 % (ref 36.5–49.3)
HGB BLD-MCNC: 16.2 G/DL (ref 12–17)
IMM GRANULOCYTES # BLD AUTO: 0.01 THOUSAND/UL (ref 0–0.2)
IMM GRANULOCYTES NFR BLD AUTO: 0 % (ref 0–2)
LYMPHOCYTES # BLD AUTO: 1.96 THOUSANDS/ΜL (ref 0.6–4.47)
LYMPHOCYTES NFR BLD AUTO: 28 % (ref 14–44)
MCH RBC QN AUTO: 30.3 PG (ref 26.8–34.3)
MCHC RBC AUTO-ENTMCNC: 34 G/DL (ref 31.4–37.4)
MCV RBC AUTO: 89 FL (ref 82–98)
METHADONE UR QL: NEGATIVE
MONOCYTES # BLD AUTO: 0.86 THOUSAND/ΜL (ref 0.17–1.22)
MONOCYTES NFR BLD AUTO: 12 % (ref 4–12)
NEUTROPHILS # BLD AUTO: 3.94 THOUSANDS/ΜL (ref 1.85–7.62)
NEUTS SEG NFR BLD AUTO: 55 % (ref 43–75)
NRBC BLD AUTO-RTO: 0 /100 WBCS
OPIATES UR QL SCN: NEGATIVE
OXYCODONE+OXYMORPHONE UR QL SCN: NEGATIVE
PCP UR QL: NEGATIVE
PLATELET # BLD AUTO: 299 THOUSANDS/UL (ref 149–390)
PMV BLD AUTO: 10.5 FL (ref 8.9–12.7)
POTASSIUM SERPL-SCNC: 3.7 MMOL/L (ref 3.5–5.3)
PROT SERPL-MCNC: 7.6 G/DL (ref 6.4–8.4)
RBC # BLD AUTO: 5.34 MILLION/UL (ref 3.88–5.62)
SODIUM SERPL-SCNC: 144 MMOL/L (ref 135–147)
THC UR QL: POSITIVE
WBC # BLD AUTO: 7.08 THOUSAND/UL (ref 4.31–10.16)

## 2022-09-08 PROCEDURE — 80053 COMPREHEN METABOLIC PANEL: CPT | Performed by: EMERGENCY MEDICINE

## 2022-09-08 PROCEDURE — 96361 HYDRATE IV INFUSION ADD-ON: CPT

## 2022-09-08 PROCEDURE — 96360 HYDRATION IV INFUSION INIT: CPT

## 2022-09-08 PROCEDURE — 80307 DRUG TEST PRSMV CHEM ANLYZR: CPT | Performed by: EMERGENCY MEDICINE

## 2022-09-08 PROCEDURE — 93005 ELECTROCARDIOGRAM TRACING: CPT

## 2022-09-08 PROCEDURE — 99284 EMERGENCY DEPT VISIT MOD MDM: CPT | Performed by: EMERGENCY MEDICINE

## 2022-09-08 PROCEDURE — 82077 ASSAY SPEC XCP UR&BREATH IA: CPT | Performed by: EMERGENCY MEDICINE

## 2022-09-08 PROCEDURE — 99284 EMERGENCY DEPT VISIT MOD MDM: CPT

## 2022-09-08 PROCEDURE — 85025 COMPLETE CBC W/AUTO DIFF WBC: CPT | Performed by: EMERGENCY MEDICINE

## 2022-09-08 PROCEDURE — 36415 COLL VENOUS BLD VENIPUNCTURE: CPT | Performed by: EMERGENCY MEDICINE

## 2022-09-08 RX ORDER — NALOXONE HYDROCHLORIDE 1 MG/ML
1 INJECTION PARENTERAL ONCE
Status: COMPLETED | OUTPATIENT
Start: 2022-09-08 | End: 2022-09-08

## 2022-09-08 RX ORDER — KETAMINE HYDROCHLORIDE 50 MG/ML
1 INJECTION INTRAMUSCULAR; INTRAVENOUS ONCE
Status: COMPLETED | OUTPATIENT
Start: 2022-09-08 | End: 2022-09-08

## 2022-09-08 RX ADMIN — SODIUM CHLORIDE 1000 ML: 0.9 INJECTION, SOLUTION INTRAVENOUS at 15:33

## 2022-09-09 LAB
ATRIAL RATE: 103 BPM
P AXIS: 70 DEGREES
PR INTERVAL: 134 MS
QRS AXIS: 137 DEGREES
QRSD INTERVAL: 88 MS
QT INTERVAL: 342 MS
QTC INTERVAL: 448 MS
T WAVE AXIS: 56 DEGREES
VENTRICULAR RATE: 103 BPM

## 2022-09-09 PROCEDURE — 93010 ELECTROCARDIOGRAM REPORT: CPT | Performed by: INTERNAL MEDICINE

## 2022-09-10 NOTE — ED PROVIDER NOTES
History  Chief Complaint   Patient presents with    Medical Problem     Pt found at drug rehab center naked in bathroom hitting head with hands  Per ems pt admits to using heroin earlier  Pt unable to answer questions at this time       Patient was found to be naked and out of control on EMS arrival   He was clearly under the influence of drugs  He was given high dose ketamine IM and was sedated on arrival to ED  Medical Problem  Associated symptoms: no abdominal pain, no chest pain, no congestion, no cough, no diarrhea, no ear pain, no fatigue, no fever, no headaches, no nausea, no rash, no rhinorrhea, no shortness of breath, no sore throat, no vomiting and no wheezing        None       Past Medical History:   Diagnosis Date    Hepatitis C        No past surgical history on file  No family history on file  I have reviewed and agree with the history as documented  E-Cigarette/Vaping     E-Cigarette/Vaping Substances     Social History     Tobacco Use    Smoking status: Current Every Day Smoker     Packs/day: 1 00     Types: Cigarettes    Smokeless tobacco: Never Used   Substance Use Topics    Alcohol use: Not Currently     Comment: social    Drug use: Yes     Frequency: 7 0 times per week     Types: Heroin     Comment: about 2 bags       Review of Systems   Constitutional: Negative for activity change, appetite change, chills, fatigue and fever  HENT: Negative for congestion, ear pain, rhinorrhea, sinus pressure, sneezing, sore throat and trouble swallowing  Eyes: Negative for photophobia, pain and visual disturbance  Respiratory: Negative for cough, chest tightness, shortness of breath and wheezing  Cardiovascular: Negative for chest pain, palpitations and leg swelling  Gastrointestinal: Negative for abdominal distention, abdominal pain, constipation, diarrhea, nausea and vomiting  Endocrine: Negative for polydipsia, polyphagia and polyuria     Genitourinary: Negative for decreased urine volume, difficulty urinating, dysuria, flank pain, frequency, hematuria and urgency  Musculoskeletal: Negative for arthralgias, back pain, gait problem, joint swelling and neck pain  Skin: Negative for color change, pallor and rash  Allergic/Immunologic: Negative for immunocompromised state  Neurological: Negative for seizures, syncope, speech difficulty, weakness, light-headedness and headaches  Psychiatric/Behavioral: Negative for confusion  All other systems reviewed and are negative  Physical Exam  Physical Exam  Vitals and nursing note reviewed  Constitutional:       General: He is not in acute distress  Appearance: He is well-developed  He is not diaphoretic  HENT:      Head: Normocephalic and atraumatic  Nose: Nose normal    Eyes:      Conjunctiva/sclera: Conjunctivae normal       Pupils: Pupils are equal, round, and reactive to light  Cardiovascular:      Rate and Rhythm: Normal rate and regular rhythm  Heart sounds: Normal heart sounds  No murmur heard  No friction rub  No gallop  Pulmonary:      Effort: Pulmonary effort is normal  No respiratory distress  Breath sounds: Normal breath sounds  No wheezing or rales  Abdominal:      General: Bowel sounds are normal       Palpations: Abdomen is soft  Tenderness: There is no abdominal tenderness  There is no guarding or rebound  Musculoskeletal:         General: Normal range of motion  Cervical back: Normal range of motion and neck supple  Skin:     General: Skin is warm and dry  Neurological:      Mental Status: He is alert and oriented to person, place, and time     Psychiatric:         Behavior: Behavior normal          Vital Signs  ED Triage Vitals [09/08/22 1530]   Temperature Pulse Respirations Blood Pressure SpO2   98 8 °F (37 1 °C) 103 14 (!) 188/119 95 %      Temp Source Heart Rate Source Patient Position - Orthostatic VS BP Location FiO2 (%)   Oral Monitor Lying Right arm -- Pain Score       --           Vitals:    09/08/22 1530 09/08/22 1534 09/08/22 1600   BP: (!) 188/119 (!) 188/119 152/84   Pulse: 103  98   Patient Position - Orthostatic VS: Lying  Lying         Visual Acuity  Visual Acuity    Flowsheet Row Most Recent Value   L Pupil Size (mm) 3   R Pupil Size (mm) 3          ED Medications  Medications   sodium chloride 0 9 % bolus 1,000 mL (0 mL Intravenous Stopped 9/8/22 1909)   ketamine (FOR EMS ONLY) (KETALAR) 50 mg/mL 500 mg (0 mg Does not apply Given to EMS 9/8/22 1545)   naloxone (FOR EMS ONLY) [facility]) 2 MG/2ML injection 2 mg (0 mg Does not apply Given to EMS 9/8/22 1545)       Diagnostic Studies  Results Reviewed     Procedure Component Value Units Date/Time    Rapid drug screen, urine [[id number]]  (Abnormal) Collected: 09/08/22 1626    Lab Status: Final result Specimen: Urine, Other Updated: 09/08/22 1737     Amph/Meth UR Positive     Barbiturate Ur Negative     Benzodiazepine Urine Negative     Cocaine Urine Positive     Methadone Urine Negative     Opiate Urine Negative     PCP Ur Negative     THC Urine Positive     Oxycodone Urine Negative    Narrative:      Presumptive report  If requested, specimen will be sent to reference lab for confirmation  FOR MEDICAL PURPOSES ONLY  IF CONFIRMATION NEEDED PLEASE CONTACT THE LAB WITHIN 5 DAYS      Drug Screen Cutoff Levels:  AMPHETAMINE/METHAMPHETAMINES  1000 ng/mL  BARBITURATES     200 ng/mL  BENZODIAZEPINES     200 ng/mL  COCAINE      300 ng/mL  METHADONE      300 ng/mL  OPIATES      300 ng/mL  PHENCYCLIDINE     25 ng/mL  THC       50 ng/mL  OXYCODONE      100 ng/mL    Comprehensive metabolic panel [[id number]] Collected: 09/08/22 1536    Lab Status: Final result Specimen: Blood from Arm, Left Updated: 09/08/22 1618     Sodium 144 mmol/L      Potassium 3 7 mmol/L      Chloride 106 mmol/L      CO2 25 mmol/L      ANION GAP 13 mmol/L      BUN 16 mg/dL      Creatinine 1 24 mg/dL      Glucose 104 mg/dL      Calcium 9 2 mg/dL AST 34 U/L      ALT 34 U/L      Alkaline Phosphatase 58 U/L      Total Protein 7 6 g/dL      Albumin 3 9 g/dL      Total Bilirubin 0 45 mg/dL      eGFR 69 ml/min/1 73sq m     Narrative:      [place] guidelines for Chronic Kidney Disease (CKD):     Stage 1 with normal or high GFR (GFR > 90 mL/min/1 73 square meters)    Stage 2 Mild CKD (GFR = 60-89 mL/min/1 73 square meters)    Stage 3A Moderate CKD (GFR = 45-59 mL/min/1 73 square meters)    Stage 3B Moderate CKD (GFR = 30-44 mL/min/1 73 square meters)    Stage 4 Severe CKD (GFR = 15-29 mL/min/1 73 square meters)    Stage 5 End Stage CKD (GFR <15 mL/min/1 73 square meters)  Note: GFR calculation is accurate only with a steady state creatinine    Ethanol [[id number]]  (Normal) Collected: 09/08/22 1536    Lab Status: Final result Specimen: Blood from Arm, Left Updated: 09/08/22 1617     Ethanol Lvl <3 mg/dL     CBC and differential [[id number]] Collected: 09/08/22 1536    Lab Status: Final result Specimen: Blood from Arm, Left Updated: 09/08/22 1545     WBC 7 08 Thousand/uL      RBC 5 34 Million/uL      Hemoglobin 16 2 g/dL      Hematocrit 47 7 %      MCV 89 fL      MCH 30 3 pg      MCHC 34 0 g/dL      RDW 13 5 %      MPV 10 5 fL      Platelets 849 Thousands/uL      nRBC 0 /100 WBCs      Neutrophils Relative 55 %      Immat GRANS % 0 %      Lymphocytes Relative 28 %      Monocytes Relative 12 %      Eosinophils Relative 4 %      Basophils Relative 1 %      Neutrophils Absolute 3 94 Thousands/µL      Immature Grans Absolute 0 01 Thousand/uL      Lymphocytes Absolute 1 96 Thousands/µL      Monocytes Absolute 0 86 Thousand/µL      Eosinophils Absolute 0 27 Thousand/µL      Basophils Absolute 0 04 Thousands/µL                  No orders to display              Procedures  Procedures         ED Course       Patient was observed in the ER for several hours  He returned to baseline and was apologetic and remorseful  MDM    Disposition  Final diagnoses:   Overdose     Time reflects when diagnosis was documented in both MDM as applicable and the Disposition within this note     Time User Action Codes Description Comment    9/8/2022  6:57 PM Samantha, 83 [address] Overdose       ED Disposition     ED Disposition   Discharge    Condition   Stable    Date/Time   Thu Sep 8, 2022  6:57 PM    Comment   Kristen Alfred discharge to home/self care  Follow-up Information    None         There are no discharge medications for this patient  No discharge procedures on file      PDMP Review     None          ED Provider  Electronically Signed by           Dalton Muscogee, DO  09/10/22 0514

## 2022-09-20 ENCOUNTER — HOSPITAL ENCOUNTER (EMERGENCY)
Facility: HOSPITAL | Age: 47
Discharge: HOME | End: 2022-09-20
Attending: EMERGENCY MEDICINE

## 2022-09-20 VITALS
RESPIRATION RATE: 18 BRPM | OXYGEN SATURATION: 97 % | SYSTOLIC BLOOD PRESSURE: 141 MMHG | TEMPERATURE: 98.2 F | DIASTOLIC BLOOD PRESSURE: 105 MMHG | HEART RATE: 112 BPM

## 2022-09-20 DIAGNOSIS — F11.10 HEROIN ABUSE (HCC): Primary | ICD-10-CM

## 2022-09-20 DIAGNOSIS — B35.6 TINEA CRURIS: ICD-10-CM

## 2022-09-20 PROCEDURE — 99283 EMERGENCY DEPT VISIT LOW MDM: CPT

## 2022-09-20 PROCEDURE — 99282 EMERGENCY DEPT VISIT SF MDM: CPT | Performed by: EMERGENCY MEDICINE

## 2022-09-20 NOTE — ED PROVIDER NOTES
History  Chief Complaint   Patient presents with    Drug Problem     Patient arrives via police after being found dancing and nodding away in a park  Patient states he injected a half bag of heroine  Patient alert and oriented at this time  Patient is a 45-year-old male that presents with the local police department for bizarre behavior after using drugs  Patient admits to using heroin about 3 hours ago  [place]  Patient has no complaints at this time  Patient states that he does want rehab for heroin but not at this time  History provided by:  Patient   used: No    Addiction Problem  Similar prior episodes: yes    Severity:  Unable to specify  Onset quality:  Unable to specify  Duration:  3 hours  Timing:  Constant  Chronicity:  Recurrent  Suspected agents:  Heroin  Associated symptoms: no abdominal pain, no hallucinations, no headaches, no nausea, no palpitations, no seizures, no shortness of breath, no suicidal ideation and no vomiting        None       Past Medical History:   Diagnosis Date    Hepatitis C        History reviewed  No pertinent surgical history  History reviewed  No pertinent family history  I have reviewed and agree with the history as documented  E-Cigarette/Vaping     E-Cigarette/Vaping Substances     Social History     Tobacco Use    Smoking status: Current Every Day Smoker     Packs/day: 1 00     Types: Cigarettes    Smokeless tobacco: Never Used   Substance Use Topics    Alcohol use: Not Currently     Comment: social    Drug use: Yes     Frequency: 7 0 times per week     Types: Heroin     Comment: about 2 bags       Review of Systems   Constitutional: Negative for chills and fever  HENT: Negative for ear pain, nosebleeds and sore throat  Eyes: Negative for pain and visual disturbance  Respiratory: Negative for cough, choking, chest tightness, shortness of breath and wheezing      Cardiovascular: Negative for chest pain and palpitations  Gastrointestinal: Negative for abdominal pain, nausea and vomiting  Genitourinary: Negative for dysuria and hematuria  Musculoskeletal: Negative for arthralgias, back pain and myalgias  Skin: Negative for color change, pallor, rash and wound  Allergic/Immunologic: Negative for immunocompromised state  Neurological: Negative for dizziness, seizures, syncope, speech difficulty, light-headedness and headaches  Psychiatric/Behavioral: Negative for hallucinations, self-injury and suicidal ideas  All other systems reviewed and are negative  Physical Exam  Physical Exam  Vitals and nursing note reviewed  Constitutional:       General: He is not in acute distress  Appearance: He is well-developed  He is not ill-appearing, toxic-appearing or diaphoretic  HENT:      Head: Normocephalic and atraumatic  Right Ear: External ear normal       Left Ear: External ear normal       Nose: No congestion  Eyes:      General: No scleral icterus  Right eye: No discharge  Left eye: No discharge  Conjunctiva/sclera: Conjunctivae normal       Right eye: Right conjunctiva is not injected  Left eye: Left conjunctiva is not injected  Pupils: Pupils are equal, round, and reactive to light  Neck:      Trachea: No tracheal deviation  Cardiovascular:      Rate and Rhythm: Regular rhythm  Tachycardia present  Pulses: Normal pulses  Heart sounds: Normal heart sounds  No murmur heard  No friction rub  Pulmonary:      Effort: Pulmonary effort is normal  No respiratory distress  Breath sounds: Normal breath sounds  No stridor  No wheezing or rales  Musculoskeletal:         General: No tenderness or deformity  Cervical back: Normal range of motion  Skin:     General: Skin is warm and dry  Capillary Refill: Capillary refill takes less than 2 seconds  Coloration: Skin is not pale        Findings: Erythema and rash present  Neurological:      General: No focal deficit present  Mental Status: He is alert and oriented to person, place, and time  Cranial Nerves: No cranial nerve deficit  Motor: No abnormal muscle tone  Gait: Gait normal    Psychiatric:         Attention and Perception: He is attentive  Mood and Affect: Mood normal          Speech: Speech normal          Behavior: Behavior normal          Thought Content: Thought content is not paranoid or delusional  Thought content does not include homicidal or suicidal ideation  Thought content does not include homicidal or suicidal plan  Cognition and Memory: Memory is not impaired  Vital Signs  ED Triage Vitals   Temperature Pulse Respirations Blood Pressure SpO2   09/20/22 0033 09/20/22 0031 09/20/22 0031 09/20/22 0031 09/20/22 0031   98 2 °F (36 8 °C) (!) 112 18 (!) 141/105 97 %      Temp Source Heart Rate Source Patient Position - Orthostatic VS BP Location FiO2 (%)   09/20/22 0033 09/20/22 0031 09/20/22 0031 09/20/22 0031 --   Oral Monitor Lying Right arm       Pain Score       09/20/22 0031       No Pain           Vitals:    09/20/22 0031   BP: (!) 141/105   Pulse: (!) 112   Patient Position - Orthostatic VS: Lying         Visual Acuity      ED Medications  Medications - No data to display    Diagnostic Studies  Results Reviewed     None                 No orders to display              Procedures  Procedures         ED Course                               SBIRT 22yo+    Flowsheet Row Most Recent Value   SBIRT (23 yo +)    In order to provide better care to our patients, we are screening all of our patients for alcohol and drug use  Would it be okay to ask you these screening questions?  No Filed at: 09/20/2022 0033                    MDM  Number of Diagnoses or Management Options  Heroin abuse [facility]): new and requires workup  Tinea cruris: new and requires workup  Diagnosis management comments: 1:09 AM  Patient appears well on exam   Not showing any signs of drug intoxication or withdrawal   Offered patient rehab and to stay for host evaluation but he does not want at this time  Patient feels comfortable being discharged  He has no complaints at this time  No emergent toxidrome that need to be controlled at this time  Patient is not ataxic, is alert and oriented x3 and clinically sober  Will discharge  Will give him resources and return precautions  Patient understands and agrees with this plan of care  Questions and concerns answered  1:16 AM  I was given the patient discharge instructions when he asked me to look at a rash in his groin  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  Patient has an area of redness to the bilateral groin area that does appear consistent with Tinea cruis without overlying infection  Advised over-the-counter antifungal treatment  Amount and/or Complexity of Data Reviewed  Review and summarize past medical records: yes    Patient Progress  Patient progress: stable      Disposition  Final diagnoses:   Heroin abuse ([facility] [address] )   Tinea cruris     Time reflects when diagnosis was documented in both MDM as applicable and the Disposition within this note     Time User Action Codes Description Comment    9/20/2022  1:09 AM Nicky Graham Add [F11 10] Heroin abuse ([facility] [address] )     9/20/2022  1:16 AM Vonda Avery Add [B35 6] Tinea cruris       ED Disposition     ED Disposition   Discharge    Condition   Stable    Date/Time   Tue Sep 20, 2022  1:09 AM    Comment   Kleber Kan discharge to home/self care                 Follow-up Information     Follow up With Specialties Details Why Contact Info Additional [address] [facility],  Medical Toxicology, Emergency Medicine   [address]  [phone]       [address] [facility] [facility] [place] [facility]  Call today To schedule an appointment as soon as you can [id number] [place] [address] Anselmo Best [id number]  [address] [facility]          There are no discharge medications for this patient  No discharge procedures on file      PDMP Review     None          ED Provider  Electronically Signed by           Kye Adkins DO  09/20/22 0127

## 2022-10-07 ENCOUNTER — HOSPITAL ENCOUNTER (INPATIENT)
Facility: HOSPITAL | Age: 47
LOS: 3 days | Discharge: HOME | DRG: 773 | End: 2022-10-10
Attending: STUDENT IN AN ORGANIZED HEALTH CARE EDUCATION/TRAINING PROGRAM | Admitting: EMERGENCY MEDICINE
Payer: COMMERCIAL

## 2022-10-07 DIAGNOSIS — B15.9 HEPATITIS A: ICD-10-CM

## 2022-10-07 DIAGNOSIS — F19.90 IVDU (INTRAVENOUS DRUG USER): ICD-10-CM

## 2022-10-07 DIAGNOSIS — F11.10 HEROIN ABUSE (HCC): Primary | ICD-10-CM

## 2022-10-07 DIAGNOSIS — F11.20 OPIOID USE DISORDER, MODERATE, DEPENDENCE (HCC): ICD-10-CM

## 2022-10-07 PROBLEM — S90.829A: Status: ACTIVE | Noted: 2022-10-07

## 2022-10-07 PROBLEM — F17.210 CIGARETTE NICOTINE DEPENDENCE: Status: ACTIVE | Noted: 2022-10-07

## 2022-10-07 PROBLEM — F11.93 OPIOID WITHDRAWAL (HCC): Status: ACTIVE | Noted: 2022-10-07

## 2022-10-07 PROBLEM — F19.10 POLYSUBSTANCE ABUSE (HCC): Status: ACTIVE | Noted: 2022-10-07

## 2022-10-07 LAB
ALBUMIN SERPL BCP-MCNC: 3.8 G/DL (ref 3.5–5)
ALP SERPL-CCNC: 63 U/L (ref 43–122)
ALT SERPL W P-5'-P-CCNC: 38 U/L
AMPHETAMINES SERPL QL SCN: POSITIVE
ANION GAP SERPL CALCULATED.3IONS-SCNC: 7 MMOL/L (ref 5–14)
AST SERPL W P-5'-P-CCNC: 59 U/L (ref 17–59)
ATRIAL RATE: 68 BPM
BARBITURATES UR QL: NEGATIVE
BASOPHILS # BLD AUTO: 0.04 THOUSANDS/ΜL (ref 0–0.1)
BASOPHILS NFR BLD AUTO: 1 % (ref 0–1)
BENZODIAZ UR QL: NEGATIVE
BILIRUB SERPL-MCNC: 0.83 MG/DL (ref 0.2–1)
BUN SERPL-MCNC: 20 MG/DL (ref 5–25)
CALCIUM SERPL-MCNC: 9 MG/DL (ref 8.4–10.2)
CHLORIDE SERPL-SCNC: 104 MMOL/L (ref 96–108)
CO2 SERPL-SCNC: 26 MMOL/L (ref 21–32)
COCAINE UR QL: POSITIVE
CREAT SERPL-MCNC: 0.96 MG/DL (ref 0.7–1.5)
EOSINOPHIL # BLD AUTO: 0.22 THOUSAND/ΜL (ref 0–0.61)
EOSINOPHIL NFR BLD AUTO: 3 % (ref 0–6)
ERYTHROCYTE [DISTWIDTH] IN BLOOD BY AUTOMATED COUNT: 13.2 % (ref 11.6–15.1)
GFR SERPL CREATININE-BSD FRML MDRD: 95 ML/MIN/1.73SQ M
GLUCOSE SERPL-MCNC: 75 MG/DL (ref 70–99)
HCT VFR BLD AUTO: 40 % (ref 36.5–49.3)
HGB BLD-MCNC: 13.2 G/DL (ref 12–17)
IMM GRANULOCYTES # BLD AUTO: 0.01 THOUSAND/UL (ref 0–0.2)
IMM GRANULOCYTES NFR BLD AUTO: 0 % (ref 0–2)
LYMPHOCYTES # BLD AUTO: 1.49 THOUSANDS/ΜL (ref 0.6–4.47)
LYMPHOCYTES NFR BLD AUTO: 18 % (ref 14–44)
MAGNESIUM SERPL-MCNC: 1.9 MG/DL (ref 1.6–2.3)
MCH RBC QN AUTO: 29.7 PG (ref 26.8–34.3)
MCHC RBC AUTO-ENTMCNC: 33 G/DL (ref 31.4–37.4)
MCV RBC AUTO: 90 FL (ref 82–98)
METHADONE UR QL: NEGATIVE
MONOCYTES # BLD AUTO: 0.86 THOUSAND/ΜL (ref 0.17–1.22)
MONOCYTES NFR BLD AUTO: 11 % (ref 4–12)
NEUTROPHILS # BLD AUTO: 5.54 THOUSANDS/ΜL (ref 1.85–7.62)
NEUTS SEG NFR BLD AUTO: 67 % (ref 43–75)
NRBC BLD AUTO-RTO: 0 /100 WBCS
OPIATES UR QL SCN: NEGATIVE
OXYCODONE+OXYMORPHONE UR QL SCN: NEGATIVE
P AXIS: 57 DEGREES
PCP UR QL: NEGATIVE
PLATELET # BLD AUTO: 276 THOUSANDS/UL (ref 149–390)
PMV BLD AUTO: 9.6 FL (ref 8.9–12.7)
POTASSIUM SERPL-SCNC: 4 MMOL/L (ref 3.5–5.3)
PR INTERVAL: 130 MS
PROT SERPL-MCNC: 6.8 G/DL (ref 6.4–8.4)
QRS AXIS: 66 DEGREES
QRSD INTERVAL: 90 MS
QT INTERVAL: 422 MS
QTC INTERVAL: 448 MS
RBC # BLD AUTO: 4.44 MILLION/UL (ref 3.88–5.62)
SARS-COV-2 RNA RESP QL NAA+PROBE: NEGATIVE
SODIUM SERPL-SCNC: 137 MMOL/L (ref 135–147)
T WAVE AXIS: 55 DEGREES
THC UR QL: POSITIVE
VENTRICULAR RATE: 68 BPM
WBC # BLD AUTO: 8.16 THOUSAND/UL (ref 4.31–10.16)

## 2022-10-07 PROCEDURE — 36415 COLL VENOUS BLD VENIPUNCTURE: CPT

## 2022-10-07 PROCEDURE — NC001 PR NO CHARGE: Performed by: PHYSICIAN ASSISTANT

## 2022-10-07 PROCEDURE — 80074 ACUTE HEPATITIS PANEL: CPT

## 2022-10-07 PROCEDURE — U0003 INFECTIOUS AGENT DETECTION BY NUCLEIC ACID (DNA OR RNA); SEVERE ACUTE RESPIRATORY SYNDROME CORONAVIRUS 2 (SARS-COV-2) (CORONAVIRUS DISEASE [COVID-19]), AMPLIFIED PROBE TECHNIQUE, MAKING USE OF HIGH THROUGHPUT TECHNOLOGIES AS DESCRIBED BY CMS-2020-01-R: HCPCS

## 2022-10-07 PROCEDURE — 99284 EMERGENCY DEPT VISIT MOD MDM: CPT

## 2022-10-07 PROCEDURE — 85025 COMPLETE CBC W/AUTO DIFF WBC: CPT

## 2022-10-07 PROCEDURE — 80307 DRUG TEST PRSMV CHEM ANLYZR: CPT

## 2022-10-07 PROCEDURE — 99223 1ST HOSP IP/OBS HIGH 75: CPT | Performed by: PHYSICIAN ASSISTANT

## 2022-10-07 PROCEDURE — 93005 ELECTROCARDIOGRAM TRACING: CPT

## 2022-10-07 PROCEDURE — 83735 ASSAY OF MAGNESIUM: CPT

## 2022-10-07 PROCEDURE — 93010 ELECTROCARDIOGRAM REPORT: CPT | Performed by: INTERNAL MEDICINE

## 2022-10-07 PROCEDURE — 96374 THER/PROPH/DIAG INJ IV PUSH: CPT

## 2022-10-07 PROCEDURE — 80053 COMPREHEN METABOLIC PANEL: CPT

## 2022-10-07 PROCEDURE — U0005 INFEC AGEN DETEC AMPLI PROBE: HCPCS

## 2022-10-07 PROCEDURE — 96375 TX/PRO/DX INJ NEW DRUG ADDON: CPT

## 2022-10-07 RX ORDER — CLONIDINE HYDROCHLORIDE 0.1 MG/1
0.2 TABLET ORAL EVERY 6 HOURS PRN
Status: DISCONTINUED | OUTPATIENT
Start: 2022-10-07 | End: 2022-10-08

## 2022-10-07 RX ORDER — KETOROLAC TROMETHAMINE 30 MG/ML
15 INJECTION, SOLUTION INTRAMUSCULAR; INTRAVENOUS ONCE
Status: COMPLETED | OUTPATIENT
Start: 2022-10-07 | End: 2022-10-07

## 2022-10-07 RX ORDER — NICOTINE 21 MG/24HR
1 PATCH, TRANSDERMAL 24 HOURS TRANSDERMAL DAILY
Status: DISCONTINUED | OUTPATIENT
Start: 2022-10-08 | End: 2022-10-10 | Stop reason: HOSPADM

## 2022-10-07 RX ORDER — BACITRACIN, NEOMYCIN, POLYMYXIN B 400; 3.5; 5 [USP'U]/G; MG/G; [USP'U]/G
1 OINTMENT TOPICAL 2 TIMES DAILY
Status: DISCONTINUED | OUTPATIENT
Start: 2022-10-07 | End: 2022-10-10 | Stop reason: HOSPADM

## 2022-10-07 RX ORDER — SODIUM CHLORIDE 9 MG/ML
75 INJECTION, SOLUTION INTRAVENOUS CONTINUOUS
Status: DISCONTINUED | OUTPATIENT
Start: 2022-10-07 | End: 2022-10-08

## 2022-10-07 RX ORDER — ONDANSETRON 2 MG/ML
4 INJECTION INTRAMUSCULAR; INTRAVENOUS EVERY 4 HOURS PRN
Status: DISCONTINUED | OUTPATIENT
Start: 2022-10-07 | End: 2022-10-10 | Stop reason: HOSPADM

## 2022-10-07 RX ORDER — ENOXAPARIN SODIUM 100 MG/ML
40 INJECTION SUBCUTANEOUS DAILY
Status: DISCONTINUED | OUTPATIENT
Start: 2022-10-08 | End: 2022-10-10 | Stop reason: HOSPADM

## 2022-10-07 RX ORDER — CLONAZEPAM 1 MG/1
1 TABLET ORAL EVERY 6 HOURS PRN
Status: DISCONTINUED | OUTPATIENT
Start: 2022-10-07 | End: 2022-10-09

## 2022-10-07 RX ORDER — BUPRENORPHINE 20 UG/H
20 PATCH TRANSDERMAL
Status: DISCONTINUED | OUTPATIENT
Start: 2022-10-07 | End: 2022-10-07

## 2022-10-07 RX ORDER — ACETAMINOPHEN 325 MG/1
650 TABLET ORAL EVERY 6 HOURS PRN
Status: DISCONTINUED | OUTPATIENT
Start: 2022-10-07 | End: 2022-10-10 | Stop reason: HOSPADM

## 2022-10-07 RX ORDER — BUPRENORPHINE 20 UG/H
20 PATCH TRANSDERMAL
Status: COMPLETED | OUTPATIENT
Start: 2022-10-07 | End: 2022-10-09

## 2022-10-07 RX ORDER — GABAPENTIN 300 MG/1
300 CAPSULE ORAL EVERY 8 HOURS PRN
Status: DISCONTINUED | OUTPATIENT
Start: 2022-10-07 | End: 2022-10-10 | Stop reason: HOSPADM

## 2022-10-07 RX ADMIN — ONDANSETRON 4 MG: 2 INJECTION INTRAMUSCULAR; INTRAVENOUS at 14:44

## 2022-10-07 RX ADMIN — BACITRACIN ZINC NEOMYCIN SULFATE POLYMYXIN B SULFATE 1 LARGE APPLICATION: 400; 3.5; 5 OINTMENT TOPICAL at 19:20

## 2022-10-07 RX ADMIN — SODIUM CHLORIDE 75 ML/HR: 0.9 INJECTION, SOLUTION INTRAVENOUS at 16:59

## 2022-10-07 RX ADMIN — KETOROLAC TROMETHAMINE 15 MG: 30 INJECTION, SOLUTION INTRAMUSCULAR; INTRAVENOUS at 14:44

## 2022-10-07 RX ADMIN — BUPRENORPHINE 20 MCG: 20 PATCH, EXTENDED RELEASE TRANSDERMAL at 16:58

## 2022-10-07 RX ADMIN — CLONIDINE HYDROCHLORIDE 0.2 MG: 0.1 TABLET ORAL at 14:43

## 2022-10-07 NOTE — ASSESSMENT & PLAN NOTE
· Presents with multiple blisters of soles of feet  · Reports he is homeless and walks frequently; was recently in damp shoes   · Reports discomfort of feet bilaterally especially with ambulation  · On exam: multiple blisters seen on soles of feet; open blister noted on left 5th digit; no evidence of diffuse erythema, no discharge, no maceration   · Continue to monitor with supportive care  · Encourage good hygiene, keep feet clean and dry

## 2022-10-07 NOTE — ASSESSMENT & PLAN NOTE
Reports currently injects heroin/fentanyl, 3-4 bags daily x several months   Last use 10/7/2022 a few hrs prior to presentation (around noon-1 pm)  Has been to rehab in the past- currently interested in rehab upon discharge   Denies past treatment with suboxone/methadone  Currently agreeable to transition to suboxone maintenance once withdrawal managed  · Manage withdrawal as above   · Consult case management for assistance with aftercare planning

## 2022-10-07 NOTE — ASSESSMENT & PLAN NOTE
· Last use 10/7/2022 around 12-1 pm  · Follow suboxone MAT protocol for withdrawal management  · Apply butrans patch   · Currently no evidence of acute withdrawal symptoms   · Continue to monitor symptoms- plan for micro-induction with suboxone once at least 24 hrs surpassed since last use   · Continue to monitor with supportive care   · Telemetry and continuous pulse ox

## 2022-10-07 NOTE — ED PROVIDER NOTES
History  Chief Complaint   Patient presents with   • Detox Evaluation     From heroine with fentanyl- last use today     • Foot Pain     Bilat - states having blisters on bottom of feet and toes  Is homeless, gets them wet and cannot change socks     Patient is a 79-year-old male coming in for detox evaluation from opiates  Patient's last use was earlier today  Patient states he uses approximately 2-3 packs per day, has been using every day for approximately 5 months  Patient states that he does inject at this time  Patient is also complaining of foot pain  Patient's feet are covered in several blisters, which he states that he does walk around a lot, cannot changes signs  History provided by:  Patient   used: No    Detox Evaluation  Similar prior episodes: yes    Severity:  Mild  Suspected agents:  Heroin  Associated symptoms: no abdominal pain, no headaches, no nausea, no palpitations, no seizures, no shortness of breath, no suicidal ideation, no vomiting and no weakness        None       Past Medical History:   Diagnosis Date   • Hepatitis C        No past surgical history on file  No family history on file  I have reviewed and agree with the history as documented  E-Cigarette/Vaping     E-Cigarette/Vaping Substances     Social History     Tobacco Use   • Smoking status: Current Every Day Smoker     Packs/day: 1 00     Types: Cigarettes   • Smokeless tobacco: Never Used   Substance Use Topics   • Alcohol use: Not Currently     Comment: social   • Drug use: Yes     Frequency: 7 0 times per week     Types: Heroin     Comment: about 2 bags       Review of Systems   Constitutional: Negative  HENT: Negative  Eyes: Negative  Respiratory: Negative  Negative for chest tightness and shortness of breath  Cardiovascular: Negative  Negative for chest pain and palpitations  Gastrointestinal: Negative  Negative for abdominal pain, nausea and vomiting  Genitourinary: Negative  Musculoskeletal: Negative  Skin: Negative  Neurological: Negative  Negative for seizures, weakness and headaches  Psychiatric/Behavioral: Negative  Negative for suicidal ideas  Physical Exam  Physical Exam  Vitals reviewed  Constitutional:       Appearance: Normal appearance  He is normal weight  HENT:      Head: Normocephalic and atraumatic  Right Ear: External ear normal       Left Ear: External ear normal       Nose: Nose normal    Eyes:      Conjunctiva/sclera: Conjunctivae normal    Cardiovascular:      Rate and Rhythm: Normal rate  Pulmonary:      Effort: Pulmonary effort is normal    Musculoskeletal:         General: Normal range of motion  Cervical back: Normal range of motion  Feet:      Comments: Patient does have numerous blisters on his feet and between his toes  No erythema, no wet sloughing skin  No swelling  No discharge  Skin:     General: Skin is warm and dry  Neurological:      Mental Status: He is alert           Vital Signs  ED Triage Vitals   Temperature Pulse Respirations Blood Pressure SpO2   10/07/22 1409 10/07/22 1409 10/07/22 1409 10/07/22 1409 10/07/22 1409   98 7 °F (37 1 °C) 91 18 119/70 99 %      Temp src Heart Rate Source Patient Position - Orthostatic VS BP Location FiO2 (%)   -- -- -- -- --             Pain Score       10/07/22 1443       6           Vitals:    10/07/22 1409   BP: 119/70   Pulse: 91         Visual Acuity      ED Medications  Medications   cloNIDine (CATAPRES) tablet 0 2 mg (0 2 mg Oral Given 10/7/22 1443)   ondansetron (ZOFRAN) injection 4 mg (4 mg Intravenous Given 10/7/22 1444)   ketorolac (TORADOL) injection 15 mg (15 mg Intravenous Given 10/7/22 1444)       Diagnostic Studies  Results Reviewed     Procedure Component Value Units Date/Time    COVID only [[id number]]  (Normal) Collected: 10/07/22 1447    Lab Status: Final result Specimen: Nares from Nose Updated: 10/07/22 1531     SARS-CoV-2 Negative    Narrative:      FOR PEDIATRIC PATIENTS - copy/paste COVID Guidelines URL to browser: [email or web address]    SARS-CoV-2 assay is a Nucleic Acid Amplification assay intended for the  qualitative detection of nucleic acid from SARS-CoV-2 in nasopharyngeal  swabs  Results are for the presumptive identification of SARS-CoV-2 RNA  Positive results are indicative of infection with SARS-CoV-2, the virus  causing COVID-19, but do not rule out bacterial infection or co-infection  with other viruses  Laboratories within the [place] and its  territories are required to report all positive results to the appropriate  public health authorities  Negative results do not preclude SARS-CoV-2  infection and should not be used as the sole basis for treatment or other  patient management decisions  Negative results must be combined with  clinical observations, patient history, and epidemiological information  This test has not been FDA cleared or approved  This test has been authorized by FDA under an Emergency Use Authorization  (EUA)  This test is only authorized for the duration of time the  declaration that circumstances exist justifying the authorization of the  emergency use of an in vitro diagnostic tests for detection of SARS-CoV-2  virus and/or diagnosis of COVID-19 infection under section 564(b)(1) of  the Act, 21 U  S C  406RFU-9(B)(7), unless the authorization is terminated  or revoked sooner  The test has been validated but independent review by FDA  and CLIA is pending  Test performed using [employer] GeneXpert: This RT-PCR assay targets N2,  a region unique to SARS-CoV-2  A conserved region in the E-gene was chosen  for pan-Sarbecovirus detection which includes SARS-CoV-2  According to CMS-2020-01-R, this platform meets the definition of high-throughput technology      Comprehensive metabolic panel [[id number]] Collected: 10/07/22 [facility]    Lab Status: Final result Specimen: Blood from Arm, Right Updated: 10/07/22 1511     Sodium 137 mmol/L      Potassium 4 0 mmol/L      Chloride 104 mmol/L      CO2 26 mmol/L      ANION GAP 7 mmol/L      BUN 20 mg/dL      Creatinine 0 96 mg/dL      Glucose 75 mg/dL      Calcium 9 0 mg/dL      AST 59 U/L      ALT 38 U/L      Alkaline Phosphatase 63 U/L      Total Protein 6 8 g/dL      Albumin 3 8 g/dL      Total Bilirubin 0 83 mg/dL      eGFR 95 ml/min/1 73sq m     Narrative:      National Kidney Disease Foundation guidelines for Chronic Kidney Disease (CKD):   •  Stage 1 with normal or high GFR (GFR > 90 mL/min/1 73 square meters)  •  Stage 2 Mild CKD (GFR = 60-89 mL/min/1 73 square meters)  •  Stage 3A Moderate CKD (GFR = 45-59 mL/min/1 73 square meters)  •  Stage 3B Moderate CKD (GFR = 30-44 mL/min/1 73 square meters)  •  Stage 4 Severe CKD (GFR = 15-29 mL/min/1 73 square meters)  •  Stage 5 End Stage CKD (GFR <15 mL/min/1 73 square meters)  Note: GFR calculation is accurate only with a steady state creatinine    Magnesium [[id number]]  (Normal) Collected: 10/07/22 1447    Lab Status: Final result Specimen: Blood from Arm, Right Updated: 10/07/22 1511     Magnesium 1 9 mg/dL     CBC and differential [[id number]] Collected: 10/07/22 1447    Lab Status: Final result Specimen: Blood from Arm, Right Updated: 10/07/22 1456     WBC 8 16 Thousand/uL      RBC 4 44 Million/uL      Hemoglobin 13 2 g/dL      Hematocrit 40 0 %      MCV 90 fL      MCH 29 7 pg      MCHC 33 0 g/dL      RDW 13 2 %      MPV 9 6 fL      Platelets 907 Thousands/uL      nRBC 0 /100 WBCs      Neutrophils Relative 67 %      Immat GRANS % 0 %      Lymphocytes Relative 18 %      Monocytes Relative 11 %      Eosinophils Relative 3 %      Basophils Relative 1 %      Neutrophils Absolute 5 54 Thousands/µL      Immature Grans Absolute 0 01 Thousand/uL      Lymphocytes Absolute 1 49 Thousands/µL      Monocytes Absolute 0 86 Thousand/µL      Eosinophils Absolute 0 22 Thousand/µL      Basophils Absolute 0 04 Thousands/µL     Hepatitis panel, acute [[id number]] Collected: 10/07/22 1447    Lab Status: In process Specimen: Blood from Arm, Right Updated: 10/07/22 1452    Rapid drug screen, urine [[id number]]     Lab Status: No result Specimen: Urine                  No orders to display              Procedures  Procedures         ED Course                                             MDM  Number of Diagnoses or Management Options  Heroin abuse ([facility] [address] ): established and worsening  Diagnosis management comments: Patient comes in for evaluation of opiate abuse  Would like detox at this time, willing to go on Suboxone  Consulted detox, who will accept patient  Patient is admitted with no significant medical finding    Counseling: I had a detailed discussion with the patient and/or guardian regarding: the historical points, exam findings, and any diagnostic results supporting the discharge diagnosis, lab results, radiology results, discharge instructions reviewed with patient and/or family/caregiver and understanding was verbalized  Instructions given to return to the emergency department if symptoms worsen or persist, or if there are any questions or concerns that arise at home       All labs reviewed and utilized in the medical decision making process     All radiology studies independently viewed by me and interpreted by the radiologist     Portions of the record may have been created with voice recognition software   Occasional wrong word or "sound a like" substitutions may have occurred due to the inherent limitations of voice recognition software   Read the chart carefully and recognize, using context, where substitutions have occurred           Amount and/or Complexity of Data Reviewed  Clinical lab tests: reviewed and ordered    Risk of Complications, Morbidity, and/or Mortality  Presenting problems: low  Diagnostic procedures: minimal  Management options: minimal    Patient Progress  Patient progress: stable      Disposition  Final diagnoses:   Heroin abuse ([address] )     Time reflects when diagnosis was documented in both MDM as applicable and the Disposition within this note     Time User Action Codes Description Comment    10/7/2022  4:12 PM Halie Alexander Add [F11 [id number]] Heroin abuse [facility])       ED Disposition     ED Disposition   Admit    Condition   Stable    Date/Time   Fri Oct 7, 2022  4:13 PM    Comment   Case was discussed with Mychal Awad PA-C and the patient's admission status was agreed to be Admission Status: inpatient status to the service of Dr Michelle Allen   Follow-up Information    None         Patient's Medications    No medications on file       No discharge procedures on file      PDMP Review     None          ED Provider  Electronically Signed by           Carmen De Paz PA-C  10/07/22 4043

## 2022-10-07 NOTE — H&P
[facility] 1975, 41 y o  male MRN: [id number]  Unit/Bed#: 5T DETOX 513-01 Encounter: [id number]  Primary Care Provider: No primary care provider on file  Date and time admitted to hospital: 10/7/2022  2:06 PM      Reason for Admission/Principal Problem: Opioid withdrawal, opioid use disorder  Admitting Provider: Mychal Cardoza PA-C   Attending Provider: Bartolo Goncalves MD   10/7/2022  2:06 PM      * Opioid withdrawal ([facility] )  Assessment & Plan  · Last use 10/7/2022 around 12-1 pm  · Follow suboxone MAT protocol for withdrawal management  · Apply butrans patch   · Currently no evidence of acute withdrawal symptoms   · Continue to monitor symptoms- plan for micro-induction with suboxone once at least 24 hrs surpassed since last use   · Continue to monitor with supportive care   · Telemetry and continuous pulse ox    Opioid use disorder, moderate, dependence ([facility] )  Assessment & Plan  Reports currently injects heroin/fentanyl, 3-4 bags daily x several months   Last use 10/7/2022 a few hrs prior to presentation (around noon-1 pm)  Has been to rehab in the past- currently interested in rehab upon discharge   Denies past treatment with suboxone/methadone  Currently agreeable to transition to suboxone maintenance once withdrawal managed  · Manage withdrawal as above   · Consult case management for assistance with aftercare planning    IVDU (intravenous drug user)  Assessment & Plan  · Reports IVDU with fentanyl  · Injects primarily in b/l forearms; denies injecting in neck, lower extremities   · Track marks evident without acute signs of infection   · Admits to reusing needles, denies sharing needles    · Check acute hepatitis panel, HIV panel   · Encourage cessation     Friction blisters of the soles  Assessment & Plan  · Presents with multiple blisters of soles of feet  · Reports he is homeless and walks frequently; was recently in damp shoes   · Reports discomfort of feet bilaterally especially with ambulation  · On exam: multiple blisters seen on soles of feet; open blister noted on left 5th digit; no evidence of diffuse erythema, no discharge, no maceration   · Continue to monitor with supportive care  · Encourage good hygiene, keep feet clean and dry     Polysubstance abuse ([address] )  Assessment & Plan  · Reports he occasionally smokes marijuana and meth  · Denies use of cocaine, alcohol, benzodiazepines   · Most recent use a few days ago   · UDS + amph/meth, cocaine, THC  · Encourage cessation     Cigarette nicotine dependence  Assessment & Plan  Current every day smoker: 0 75-1 ppd   Encourage cessation  Offer nicotine replacement         VTE Prophylaxis: Enoxaparin (Lovenox)  / sequential compression device   Code Status: level 1 full code       Anticipated Length of Stay:  Patient will be admitted on an Inpatient basis with an anticipated length of stay of  2  midnights  Justification for Hospital Stay: ongoing medical management of opioid withdrawal     For any questions or concerns, please Tiger Text the advanced practitioner in the role of [facility]-DETOX-AP On Call      This patient qualifies for Level IV medically managed intensive inpatient services under the criteria set by the American Society of Addiction Medicine, including dimensions 1-3  The patient is in withdrawal (or is intoxicated with high risk of withdrawal), with severe and unstable medical and/or psychiatric (dual diagnosis) problems, requiring requires 24-hour medical and nursing care and the full resources of a [address] patient to medical detox unit and continue supportive care and stabilization of acute opioid withdrawal per medical toxicology/detox medication assisted treatment pathway   Monitor opioid severity via Clinical Opioid Withdrawal Scale (COWS) Q4 hours and administer buprenorphine/naloxone 8mg/2mg when COWS >8, or when greater than 24 hours have elapsed from most recent opioid use (excluding long-acting opioids, such as methadone)  Continue to monitor opioid severity Q30-60 minutes after first dose and administer additional buprenorphine 2-4mg every 30-60 minutes until COWS < 8 for two consecutive hours  (Max dose 32 mg)  Adjunctive medications administered as needed:  Clonidine 0 1 mg PO Q6 hours PRN anxiety or palpitations    Gabapentin 300mg PO Q8 hours PRN anxiety    Ibuprofen 600 mg PO Q6 hours PRN pain    Acetaminophen 1000mg PO Q8 hours PRN pain    Ondansetron 4 mg PO Q6 hours PRN N/V    Nicotine patch 7, 14, 21 mg  PRN nicotine withdrawal   Trazodone 50 mg PO QHS PRN sleep    Loperamide 4 mg PO PRN diarrhea up to 16 mg/day       The risks, benefits and mechanism of buprenorphine/naloxone were discussed and patient agreed to treatment  Case management consultation will take place to assist with coordination of subsequent treatment after discharge  Administer daily multivitamin  Evaluate and treat for coexisting substance use, such as nicotine  Discuss risk factors for infectious disease, such as history of intravenous drug abuse, and offer hepatitis and HIV screening if indicated  Hx and PE limited by: n/a    HPI: Kristen Alfred is a 41y o  year old male who presents seeking detox from fentanyl  Patient presented to [facility] ED 10/7/2022 seeking opioid detox, subsequently admitted to [facility] medical detox unit for medically-assisted opioid withdrawal  Patient reports he has been injecting 3-4 bags fentanyl daily  Injects primarily in b/l forearms, occasionally reuses needles but denies sharing needles  Denies injecting elsewhere, denies injecting into neck or lower extremities/feet  Reports last use was a few hours prior to arrival  Has been to inpatient rehab before, most recently in 2018   States he has never been on methadone or suboxone in the past  Reports he is currently interested in suboxone treatment and inpatient rehab  PMH reviewed, patient reports he is not currently taking any prescription medications  Opioids currently used: fentanyl  Route of use: intravenous  Date/Time of Last Opioid Use: 10/7/2022   Current Signs/Symptoms of Opioid Withdrawal: none    COWS score:   Clinical Opiate Withdrawal Scale  Pulse: 59  Resting Pulse Rate: Measured After Patient is Sitting or Lying for One Minute: Pulse rate 80 or below  GI Upset: Over Last Half Hour: No GI symptoms  Sweating: Over Past Half Hour Not Accounted for by Room Temperature of Patient Activity: No report of chills or flushing  Tremor: Observation of Outstretched Hands: No tremor  Restlessness: Observation During Assessment: Able to sit still  Yawning: Observation During Assessment: No yawning  Pupil Size: Pupils pinned or normal size for room light  Anxiety and Irritability: None  Bone or Joint Aches: If Patient was Having Pain Previously, Only the Additional Component Attributed to Opiate Withdrawal is Scored: Not present  Gooseflesh Skin: Skin is smooth  Runny Nose or Tearing: Not Accounted for by Cold Symptoms or Allergies: Not present  Clinical Opiate Withdrawal Scale Total Score: 0      Methadone & Buprenorphine History  History of prior treatment for opioid dependence? Previously attended inpatient rehab, most recently 2018  Currently on Methadone Maintenance? no  History of prior treatment with Suboxone? no  Currently taking Suboxone? no  History of using Suboxone without having a prescription? no  History of IVDA? yes  Co-existing substance use? Yes- occasionally smokes meth and marijuana   Otherwise denies use of cocaine, benzodiazepines, alcohol     Review of PDMP: no    Social History     Substance and Sexual Activity   Alcohol Use Not Currently    Comment: social     Social History     Substance and Sexual Activity   Drug Use Yes   • Frequency: 7 0 times per week   • Types: Heroin    Comment: about 2 bags     Social History     Tobacco Use Smoking Status Current Every Day Smoker   • Packs/day: 1 00   • Types: Cigarettes   Smokeless Tobacco Never Used       Review of Systems   Constitutional: Negative for appetite change, chills and fever  HENT: Negative for congestion, ear pain, sneezing and sore throat  Eyes: Negative for pain and visual disturbance  Respiratory: Negative for cough and shortness of breath  Cardiovascular: Negative for chest pain and palpitations  Gastrointestinal: Negative for abdominal pain, constipation, diarrhea, nausea and vomiting  Genitourinary: Negative for difficulty urinating, dysuria and hematuria  Musculoskeletal: Negative for arthralgias and back pain  Skin: Positive for wound (multiple blisters on soles of feet b/l)  Negative for color change and rash  Neurological: Negative for dizziness, seizures, syncope, weakness, light-headedness and headaches  Psychiatric/Behavioral: The patient is not nervous/anxious  All other systems reviewed and are negative  Historical Information   Past Medical History:   Diagnosis Date   • Hepatitis C      No past surgical history on file  No family history on file    Social History   Marital Status: Single   Occupation: unemployed  Patient Pre-hospital Living Situation: homeless   Patient Pre-hospital Level of Mobility: independent   Patient Pre-hospital Diet Restrictions: none     Allergies   Allergen Reactions   • Benadryl [Diphenhydramine]      States it gives him restless legs and he breaks out in hives   • Penicillins Other (See Comments)     Pt reports acts strange       Prior to Admission medications    Not on File       Current Facility-Administered Medications   Medication Dose Route Frequency   • acetaminophen (TYLENOL) tablet 650 mg  650 mg Oral Q6H PRN   • clonazePAM (KlonoPIN) tablet 1 mg  1 mg Oral Q6H PRN   • cloNIDine (CATAPRES) tablet 0 2 mg  0 2 mg Oral Q6H PRN   • [START ON 10/8/2022] enoxaparin (LOVENOX) subcutaneous injection 40 mg  40 mg Subcutaneous Daily   • gabapentin (NEURONTIN) capsule 300 mg  300 mg Oral Q8H PRN   • neomycin-bacitracin-polymyxin b (NEOSPORIN) ointment 1 large application  1 large application Topical BID   • [START ON 10/8/2022] nicotine (NICODERM CQ) 21 mg/24 hr TD 24 hr patch 1 patch  1 patch Transdermal Daily   • ondansetron (ZOFRAN) injection 4 mg  4 mg Intravenous Q4H PRN   • sodium chloride 0 9 % infusion  75 mL/hr Intravenous Continuous   • transdermal buprenorphine (BUTRANS) 20 mcg/hr TD patch 20 mcg  20 mcg Transdermal Q7 Days       Continuous Infusions:sodium chloride, 75 mL/hr, Last Rate: 75 mL/hr (10/07/22 1659)             Objective     No intake or output data in the 24 hours ending 10/07/22 1922    Invasive Devices:   Peripheral IV 09/08/22 Left Forearm (Active)       Peripheral IV 10/07/22 Right Arm (Active)   Site Assessment WDL; Clean;Dry; Intact 10/07/22 1443   Dressing Type Transparent 10/07/22 1443   Line Status Blood return noted; Flushed 10/07/22 1443   Dressing Status Clean;Dry; Intact 10/07/22 1443       Vitals   Vitals:    10/07/22 1409 10/07/22 1638 10/07/22 1914   BP: 119/70 100/58 117/71   TempSrc:  Temporal Temporal   Pulse: 91 74 59   Resp: 18 18 16   Patient Position - Orthostatic VS:  Lying Lying   Temp: 98 7 °F (37 1 °C) 98 6 °F (37 °C) 97 9 °F (36 6 °C)       Physical Exam  Vitals reviewed  Constitutional:       General: He is not in acute distress  Appearance: Normal appearance  He is not ill-appearing or diaphoretic  HENT:      Head: Normocephalic and atraumatic  Nose: Nose normal  No congestion  Mouth/Throat:      Mouth: Mucous membranes are moist       Pharynx: Oropharynx is clear  Eyes:      General: No scleral icterus  Extraocular Movements: Extraocular movements intact  Conjunctiva/sclera: Conjunctivae normal       Pupils: Pupils are equal, round, and reactive to light  Cardiovascular:      Rate and Rhythm: Normal rate and regular rhythm        Pulses: Normal pulses  Heart sounds: Normal heart sounds  No murmur heard  No friction rub  No gallop  Pulmonary:      Effort: Pulmonary effort is normal  No respiratory distress  Breath sounds: Normal breath sounds  No wheezing, rhonchi or rales  Abdominal:      General: Abdomen is flat  Bowel sounds are normal  There is no distension  Palpations: Abdomen is soft  Tenderness: There is no abdominal tenderness  There is no guarding  Musculoskeletal:         General: No swelling  Normal range of motion  Cervical back: Normal range of motion  Right lower leg: No edema  Left lower leg: No edema  Feet:      Right foot:      Skin integrity: Blister (mutliple blisters on soles of feet) present  Left foot:      Skin integrity: Blister (mutliple blisters on soles of feet, one is broken open on left fifth digit ) present  Skin:     General: Skin is warm and dry  Findings: No abscess or erythema  Neurological:      General: No focal deficit present  Mental Status: He is alert and oriented to person, place, and time  Mental status is at baseline  Sensory: No sensory deficit  Psychiatric:         Attention and Perception: Attention normal          Mood and Affect: Mood normal          Speech: Speech normal          Behavior: Behavior is cooperative  DATA    EKG, Pathology, and Other Studies: I have personally reviewed pertinent reports  · EKG 10/7/2022: "Sinus rhythm with sinus arrhythmia  Otherwise normal ECG  When compared with ECG of 08-SEP-2022 15:27, Vent  rate has decreased BY  35 BPM  QRS axis Shifted left " (Confirmed by Juju Arizmendi)     Lab Results: I have personally reviewed pertinent reports          CBC ETOH     Lab Results   Component Value Date    WBC 8 16 10/07/2022    RBC 4 44 10/07/2022    HGB 13 2 10/07/2022    HCT 40 0 10/07/2022    MCV 90 10/07/2022    MCH 29 7 10/07/2022    MCHC 33 0 10/07/2022    RDW 13 2 10/07/2022     10/07/2022    MPV 9 6 10/07/2022      No results found for: LACTICACID   CMP UA         Component Value Date/Time    K 4 0 10/07/2022 1447     10/07/2022 1447    CO2 26 10/07/2022 1447    BUN 20 10/07/2022 1447    CREATININE 0 96 10/07/2022 1447         Component Value Date/Time    CALCIUM 9 0 10/07/2022 1447    ALKPHOS 63 10/07/2022 1447    AST 59 10/07/2022 1447    ALT 38 10/07/2022 1447      No results found for: CLARITYU, COLORU, SPECGRAV, PHUR, GLUCOSEU, KETONESU, BLOODU, PROTEIN UA, NITRITE, BILIRUBINUR, UROBILINOGEN, LEUKOCYTESUR, WBCUA, RBCUA, HYALINE, BACTERIA, EPIS     Liver Function Test: ASA     Lab Results   Component Value Date    TBILI 0 83 10/07/2022    ALKPHOS 63 10/07/2022    AST 59 10/07/2022    ALT 38 10/07/2022    TP 6 8 10/07/2022    ALB 3 8 10/07/2022      No results found for: SALICYLATE   Troponin APAP     No results found for: TROPONINI   No results found for: ACTMNPHEN   VBG HCG     No results found for: PHVEN, JKI4RUB, PO2VEN, OZZ6DEN, BEVEN, Z2CUVPWLC, G2TRPSJ   No results found for: HCGQUANT   ABG Urine Drug Screen     No results found for: PHART, LHI8TAT, PO2ART, XPI9KKP, BEART, O9ACEJUHZ, O2HGB, SOURC, WILLIAM, VTAC, ACRATE, INSPIREDAIR, PEEP   Lab Results   Component Value Date    AMPMETHUR Positive (A) 10/07/2022    BARBTUR Negative 10/07/2022    BDZUR Negative 10/07/2022    COCAINEUR Positive (A) 10/07/2022    METHADONEUR Negative 10/07/2022    OPIATEUR Negative 10/07/2022    PCPUR Negative 10/07/2022    THCUR Positive (A) 10/07/2022    OXYCODONEUR Negative 10/07/2022      Lactate INR     No results found for: LACTICACID   No results found for: INR   PTT Protime     No results found for: PTT   No results found for: PROTIME   Hepatitis HIV     No results found for: HEPBSAG, HEPCAB   No results found for: [id number], [id number]       Imaging Studies: I have personally reviewed pertinent reports          Counseling / Coordination of Care  Total floor / unit time spent today 51 minutes  Greater than 50% of total time was spent with the patient and / or family counseling and / or coordination of care  Minutes of critical care time 21  -Critical care time was exclusive of separately billable procedures and teaching time    -Critical care was necessary to treat or prevent imminent or life-threatening deterioration of the following condition: CNS failure/compromise, toxidrome (opioid withdrawal), withdrawal  -Critical care time was spent personally by me on the following activities as well as the above as per the course and rest of chart: obtaining history from patient/surrogate, development of a treatment plan, discussions with referring provider(s), evaluation of patient's response to the treatment, examination of the patient, performing  treatments and interventions, re-evaluation of the patient's condition, review of old charts, ordering/interpreting laboratory studies, ordering/interpreting of radiographic studies  ** Please Note: This note has been constructed using a voice recognition system   **

## 2022-10-07 NOTE — ASSESSMENT & PLAN NOTE
· Reports IVDU with fentanyl  · Injects primarily in b/l forearms; denies injecting in neck, lower extremities   · Track marks evident without acute signs of infection   · Admits to reusing needles, denies sharing needles    · Check acute hepatitis panel, HIV panel   · Encourage cessation

## 2022-10-08 LAB
ANION GAP SERPL CALCULATED.3IONS-SCNC: 3 MMOL/L (ref 5–14)
BUN SERPL-MCNC: 16 MG/DL (ref 5–25)
CALCIUM SERPL-MCNC: 8.5 MG/DL (ref 8.4–10.2)
CHLORIDE SERPL-SCNC: 108 MMOL/L (ref 96–108)
CO2 SERPL-SCNC: 25 MMOL/L (ref 21–32)
CREAT SERPL-MCNC: 0.72 MG/DL (ref 0.7–1.5)
ERYTHROCYTE [DISTWIDTH] IN BLOOD BY AUTOMATED COUNT: 13.7 % (ref 11.6–15.1)
GFR SERPL CREATININE-BSD FRML MDRD: 112 ML/MIN/1.73SQ M
GLUCOSE SERPL-MCNC: 97 MG/DL (ref 70–99)
HAV IGM SER QL: REACTIVE
HBV CORE IGM SER QL: ABNORMAL
HBV SURFACE AG SER QL: ABNORMAL
HCT VFR BLD AUTO: 41.8 % (ref 36.5–49.3)
HCV AB SER QL: ABNORMAL
HGB BLD-MCNC: 13.7 G/DL (ref 12–17)
HIV 1+2 AB+HIV1 P24 AG SERPL QL IA: NORMAL
HIV1 P24 AG SER QL: NORMAL
MAGNESIUM SERPL-MCNC: 1.9 MG/DL (ref 1.6–2.3)
MCH RBC QN AUTO: 29.7 PG (ref 26.8–34.3)
MCHC RBC AUTO-ENTMCNC: 32.8 G/DL (ref 31.4–37.4)
MCV RBC AUTO: 91 FL (ref 82–98)
PLATELET # BLD AUTO: 274 THOUSANDS/UL (ref 149–390)
PMV BLD AUTO: 10.1 FL (ref 8.9–12.7)
POTASSIUM SERPL-SCNC: 4 MMOL/L (ref 3.5–5.3)
RBC # BLD AUTO: 4.62 MILLION/UL (ref 3.88–5.62)
SODIUM SERPL-SCNC: 136 MMOL/L (ref 135–147)
WBC # BLD AUTO: 5.98 THOUSAND/UL (ref 4.31–10.16)

## 2022-10-08 PROCEDURE — 87522 HEPATITIS C REVRS TRNSCRPJ: CPT | Performed by: PHYSICIAN ASSISTANT

## 2022-10-08 PROCEDURE — 87806 HIV AG W/HIV1&2 ANTB W/OPTIC: CPT | Performed by: PHYSICIAN ASSISTANT

## 2022-10-08 PROCEDURE — 80048 BASIC METABOLIC PNL TOTAL CA: CPT | Performed by: PHYSICIAN ASSISTANT

## 2022-10-08 PROCEDURE — 83735 ASSAY OF MAGNESIUM: CPT | Performed by: PHYSICIAN ASSISTANT

## 2022-10-08 PROCEDURE — 99233 SBSQ HOSP IP/OBS HIGH 50: CPT

## 2022-10-08 PROCEDURE — 85027 COMPLETE CBC AUTOMATED: CPT | Performed by: PHYSICIAN ASSISTANT

## 2022-10-08 RX ORDER — BUPRENORPHINE AND NALOXONE 2; .5 MG/1; MG/1
1 FILM, SOLUBLE BUCCAL; SUBLINGUAL ONCE
Status: COMPLETED | OUTPATIENT
Start: 2022-10-08 | End: 2022-10-08

## 2022-10-08 RX ORDER — BUPRENORPHINE AND NALOXONE 2; .5 MG/1; MG/1
2 FILM, SOLUBLE BUCCAL; SUBLINGUAL
Status: DISCONTINUED | OUTPATIENT
Start: 2022-10-08 | End: 2022-10-08

## 2022-10-08 RX ORDER — BUPRENORPHINE AND NALOXONE 2; .5 MG/1; MG/1
4 FILM, SOLUBLE BUCCAL; SUBLINGUAL ONCE
Status: COMPLETED | OUTPATIENT
Start: 2022-10-08 | End: 2022-10-08

## 2022-10-08 RX ORDER — DIAZEPAM 5 MG/ML
10 INJECTION, SOLUTION INTRAMUSCULAR; INTRAVENOUS ONCE
Status: COMPLETED | OUTPATIENT
Start: 2022-10-08 | End: 2022-10-08

## 2022-10-08 RX ORDER — CLONAZEPAM 1 MG/1
1 TABLET ORAL ONCE
Status: COMPLETED | OUTPATIENT
Start: 2022-10-08 | End: 2022-10-08

## 2022-10-08 RX ORDER — BUPRENORPHINE AND NALOXONE 8; 2 MG/1; MG/1
8 FILM, SOLUBLE BUCCAL; SUBLINGUAL 2 TIMES DAILY
Status: DISCONTINUED | OUTPATIENT
Start: 2022-10-09 | End: 2022-10-10 | Stop reason: HOSPADM

## 2022-10-08 RX ORDER — WATER 1000 ML/1000ML
INJECTION, SOLUTION INTRAVENOUS
Status: COMPLETED
Start: 2022-10-08 | End: 2022-10-08

## 2022-10-08 RX ORDER — OLANZAPINE 10 MG/1
INJECTION, POWDER, LYOPHILIZED, FOR SOLUTION INTRAMUSCULAR
Status: COMPLETED
Start: 2022-10-08 | End: 2022-10-08

## 2022-10-08 RX ORDER — BUPRENORPHINE 2 MG/1
0.5 TABLET SUBLINGUAL EVERY 6 HOURS
Status: DISCONTINUED | OUTPATIENT
Start: 2022-10-08 | End: 2022-10-08

## 2022-10-08 RX ORDER — OLANZAPINE 10 MG/1
10 INJECTION, POWDER, LYOPHILIZED, FOR SOLUTION INTRAMUSCULAR ONCE
Status: COMPLETED | OUTPATIENT
Start: 2022-10-08 | End: 2022-10-08

## 2022-10-08 RX ORDER — BUPRENORPHINE AND NALOXONE 8; 2 MG/1; MG/1
8 FILM, SOLUBLE BUCCAL; SUBLINGUAL ONCE
Status: COMPLETED | OUTPATIENT
Start: 2022-10-09 | End: 2022-10-09

## 2022-10-08 RX ADMIN — SODIUM CHLORIDE 75 ML/HR: 0.9 INJECTION, SOLUTION INTRAVENOUS at 05:17

## 2022-10-08 RX ADMIN — CLONAZEPAM 1 MG: 1 TABLET ORAL at 10:42

## 2022-10-08 RX ADMIN — ONDANSETRON 4 MG: 2 INJECTION INTRAMUSCULAR; INTRAVENOUS at 13:46

## 2022-10-08 RX ADMIN — BUPRENORPHINE AND NALOXONE 2 MG: 2; .5 FILM BUCCAL; SUBLINGUAL at 18:17

## 2022-10-08 RX ADMIN — DIAZEPAM 10 MG: 5 INJECTION, SOLUTION INTRAMUSCULAR; INTRAVENOUS at 13:46

## 2022-10-08 RX ADMIN — NICOTINE 1 PATCH: 21 PATCH, EXTENDED RELEASE TRANSDERMAL at 08:03

## 2022-10-08 RX ADMIN — OLANZAPINE 10 MG: 10 INJECTION, POWDER, LYOPHILIZED, FOR SOLUTION INTRAMUSCULAR at 15:17

## 2022-10-08 RX ADMIN — CLONAZEPAM 1 MG: 1 TABLET ORAL at 07:55

## 2022-10-08 RX ADMIN — BUPRENORPHINE AND NALOXONE 4 MG: 2; .5 FILM BUCCAL; SUBLINGUAL at 20:06

## 2022-10-08 RX ADMIN — CLONAZEPAM 1 MG: 1 TABLET ORAL at 18:16

## 2022-10-08 RX ADMIN — Medication 0.5 MG: at 13:10

## 2022-10-08 RX ADMIN — BUPRENORPHINE AND NALOXONE 1 MG: 2; .5 FILM BUCCAL; SUBLINGUAL at 16:38

## 2022-10-08 RX ADMIN — GABAPENTIN 300 MG: 300 CAPSULE ORAL at 05:27

## 2022-10-08 RX ADMIN — WATER 10 ML: 1 INJECTION INTRAMUSCULAR; INTRAVENOUS; SUBCUTANEOUS at 15:17

## 2022-10-08 NOTE — CASE MANAGEMENT
CM attempted to meet with pt to complete intake assessment; pt is not feeling well and requested that CM return at a later time

## 2022-10-08 NOTE — PROGRESS NOTES
Progress Note - Medical Toxicology    Rosmery Ayers 41 y o  male MRN: [id number]  Unit/Bed#: 5T DETOX 513-01 Encounter: [id number]  MEDICAL DETOX UNIT, LEVEL 4  Department of Medical Toxicology  Reason for Admission/Principal Problem: opioid withdrawal   Rounding Provider: Carlitos Branch MD         * Opioid withdrawal [facility])  Assessment & Plan  · Last use 10/7/2022 around 12-1 pm  · Follow suboxone MAT protocol for withdrawal management  · Apply butrans patch   · Patient is currently experiencing moderate- severe withdrawal symptoms of anxiety,agitation, piloerection, stomach cramps  · He is requesting to start low dose initiation of buprenorphine  · Will treat patient with symptomatic care until 24 hours and plan to begin low dose initiation of buprenorphine 0 5mg q6 hours x 4 doses this afternoon   · Continue to monitor symptoms- plan for micro-induction with suboxone once at least 24 hrs surpassed since last use   · Continue to monitor with supportive care   ·  continuous pulse ox    Opioid use disorder, moderate, dependence ([facility] )  Assessment & Plan  Reports currently injects heroin/fentanyl, 3-4 bags daily x several months   Last use 10/7/2022 a few hrs prior to presentation (around noon-1 pm)  Has been to rehab in the past- currently interested in rehab upon discharge   Denies past treatment with suboxone/methadone  Currently agreeable to transition to suboxone maintenance once withdrawal managed  · Manage withdrawal as above   · Consult case management for assistance with aftercare planning    Friction blisters of the soles  Assessment & Plan  · Presents with multiple blisters of soles of feet  · Reports he is homeless and walks frequently; was recently in damp shoes   · Reports discomfort of feet bilaterally especially with ambulation  · On exam: multiple blisters seen on soles of feet; open blister noted on left 5th digit; no evidence of diffuse erythema, no discharge, no maceration · Continue to monitor with supportive care  · Encourage good hygiene, keep feet clean and dry     Polysubstance abuse ([address] )  Assessment & Plan  · Reports he occasionally smokes marijuana and meth  · Denies use of cocaine, alcohol, benzodiazepines   · Most recent use a few days ago   · UDS + amph/meth, cocaine, THC  · Encourage cessation     Cigarette nicotine dependence  Assessment & Plan  Current every day smoker: 0 75-1 ppd   Encourage cessation  Offer nicotine replacement    IVDU (intravenous drug user)  Assessment & Plan  · Reports IVDU with fentanyl  · Injects primarily in b/l forearms; denies injecting in neck, lower extremities   · Track marks evident without acute signs of infection   · Admits to reusing needles, denies sharing needles  · Check acute hepatitis panel, HIV panel - pending  · Encourage cessation           VTE Pharmacologic Prophylaxis:   Pharmacologic: Enoxaparin (Lovenox)  Mechanical VTE Prophylaxis in Place: no    Code Status: Level 1 - Full Code    Patient Centered Rounds: I have performed bedside rounds with nursing staff today  Discussions with Specialists or Other Care Team Provider: Case Management    Education and Discussions with Family / Patient: I personally answered all of the patient's questions to the best of my ability  Time Spent for Care: 30 minutes  More than 50% of total time spent on counseling and coordination of care as described above  Current Length of Stay: 1 day(s)    Current Patient Status: Inpatient     Certification Statement: The patient will continue to require additional inpatient hospital stay due to opioid withdrawal-- low dose initiation of buprenorphine  Discharge Plan: Anticipated Discharge within 24-48 hours after stabilization on MAT       Subjective:   Patient seen and examined at bedside  Reports worsening withdrawal symptoms of myalgia, stomach cramps, congestion, anxiety and restlessness   Patient reports that he is almost 24 hours out since last reported use  He is agreeable to start micro-induction this afternoon       Objective:     Clinical Opiate Withdrawal Scale  Pulse: 77  Resting Pulse Rate: Measured After Patient is Sitting or Lying for One Minute: Pulse rate 80 or below  GI Upset: Over Last Half Hour: No GI symptoms  Sweating: Over Past Half Hour Not Accounted for by Room Temperature of Patient Activity: No report of chills or flushing  Tremor: Observation of Outstretched Hands: No tremor  Restlessness: Observation During Assessment: Able to sit still  Yawning: Observation During Assessment: No yawning  Pupil Size: Pupils pinned or normal size for room light  Anxiety and Irritability: None  Bone or Joint Aches: If Patient was Having Pain Previously, Only the Additional Component Attributed to Opiate Withdrawal is Scored: Not present  Gooseflesh Skin: Skin is smooth  Runny Nose or Tearing: Not Accounted for by Cold Symptoms or Allergies: Not present  Clinical Opiate Withdrawal Scale Total Score: 0    No data recorded      Last 24 Hours Medication List:   Current Facility-Administered Medications   Medication Dose Route Frequency Provider Last Rate   • acetaminophen  650 mg Oral Q6H PRN Amna Cardoza PA-C     • buprenorphine  0 5 mg Sublingual Q6H Rex Maya PA-C     • clonazePAM  1 mg Oral Q6H PRN Amna Cardoza PA-C     • cloNIDine  0 2 mg Oral Q6H PRN Helen Savage PA-C     • enoxaparin  40 mg Subcutaneous Daily Amna Cardoza PA-C     • gabapentin  300 mg Oral Q8H PRN Amna Cardoza PA-C     • neomycin-bacitracin-polymyxin b  1 large application Topical BID Amna Cardoza PA-C     • nicotine  1 patch Transdermal Daily Amna Cardoza PA-C     • ondansetron  4 mg Intravenous Q4H PRN Helen Savage PA-C     • sodium chloride  75 mL/hr Intravenous Continuous Amna Cardoza PA-C 75 mL/hr (10/08/22 0517)   • transdermal buprenorphine  20 mcg Transdermal Q7 Days Amna OLVIN Cardoza           Vitals:   Temp (24hrs), Av 6 °F (37 °C), Min:97 9 °F (36 6 °C), Max:99 2 °F (37 3 °C)    Temp:  [97 9 °F (36 6 °C)-99 2 °F (37 3 °C)] 98 2 °F (36 8 °C)  HR:  [59-98] 77  Resp:  [16-18] 16  BP: (100-133)/(58-82) 133/82  SpO2:  [95 %-100 %] 99 %  Body mass index is 22 38 kg/m²  Input and Output Summary (last 24 hours):No intake or output data in the 24 hours ending 10/08/22 1119    Physical Exam:   Physical Exam  Vitals reviewed  Constitutional:       General: He is in acute distress  Appearance: He is diaphoretic  Comments: Restless, unable to sit still    Eyes:      General: No scleral icterus  Pupils: Pupils are equal, round, and reactive to light  Cardiovascular:      Rate and Rhythm: Normal rate and regular rhythm  Heart sounds: No murmur heard  Pulmonary:      Effort: No respiratory distress  Breath sounds: Normal breath sounds  No wheezing  Abdominal:      Tenderness: There is abdominal tenderness (diffuse )  Neurological:      Mental Status: He is oriented to person, place, and time  Psychiatric:         Mood and Affect: Mood is anxious  Mood is not depressed           Additional Data:     Labs: keep all most recent labs as listed on admission templates   Results from last 7 days   Lab Units 10/08/22  0524 10/07/22  1447   WBC Thousand/uL 5 98 8 16   HEMOGLOBIN g/dL 13 7 13 2   HEMATOCRIT % 41 8 40 0   PLATELETS Thousands/uL 274 276   NEUTROS PCT %  --  67   LYMPHS PCT %  --  18   MONOS PCT %  --  11   EOS PCT %  --  3      Results from last 7 days   Lab Units 10/08/22  0524 10/07/22  1447   SODIUM mmol/L 136 137   POTASSIUM mmol/L 4 0 4 0   CHLORIDE mmol/L 108 104   CO2 mmol/L 25 26   BUN mg/dL 16 20   CREATININE mg/dL 0 72 0 96   ANION GAP mmol/L 3* 7   CALCIUM mg/dL 8 5 9 0   ALBUMIN g/dL  --  3 8   TOTAL BILIRUBIN mg/dL  --  0 83   ALK PHOS U/L  --  63   ALT U/L  --  38   AST U/L  --  59   GLUCOSE RANDOM mg/dL 97 75 * I Have Reviewed All Lab Data Listed Above  * Additional Pertinent Lab Tests Reviewed: [address] Admission Reviewed      Imaging Studies: I have personally reviewed pertinent reports  Recent Cultures (last 7 days): Today, Patient Was Seen By: Teto Stout    ** Please Note: Dictation voice to text software may have been used in the creation of this document   **

## 2022-10-08 NOTE — PLAN OF CARE
Problem: SUBSTANCE USE/ABUSE  Goal: By discharge, will develop insight into their chemical dependency and sustain motivation to continue in recovery  Description: INTERVENTIONS:  - Attends all daily group sessions and scheduled AA groups  - Actively practices coping skills through participation in the therapeutic community and adherence to program rules  - Reviews and completes assignments from individual treatment plan  - Assist patient development of understanding of their personal cycle of addiction and relapse triggers  Outcome: Progressing  Goal: By discharge, patient will have ongoing treatment plan addressing chemical dependency  Description: INTERVENTIONS:  - Assist patient with resources and/or appointments for ongoing recovery based living  Outcome: Progressing     Problem: Potential for Falls  Goal: Patient will remain free of falls  Description: INTERVENTIONS:  - Educate patient/family on patient safety including physical limitations  - Instruct patient to call for assistance with activity   - Consult OT/PT to assist with strengthening/mobility   - Keep Call bell within reach  - Keep bed low and locked with side rails adjusted as appropriate  - Keep care items and personal belongings within reach  - Initiate and maintain comfort rounds  - Make Fall Risk Sign visible to staff  - Apply yellow socks and bracelet for high fall risk patients  - Consider moving patient to room near nurses station  Outcome: Progressing

## 2022-10-08 NOTE — ASSESSMENT & PLAN NOTE
· Reports IVDU with fentanyl  · Injects primarily in b/l forearms; denies injecting in neck, lower extremities   · Track marks evident without acute signs of infection   · Admits to reusing needles, denies sharing needles    · Check acute hepatitis panel, HIV panel - pending  · Encourage cessation

## 2022-10-08 NOTE — ASSESSMENT & PLAN NOTE
· Reports he occasionally smokes marijuana and meth  · Denies use of cocaine, alcohol, benzodiazepines   · Most recent use a few days ago   · UDS + amph/meth, cocaine, THC  · Encourage cessation

## 2022-10-08 NOTE — ASSESSMENT & PLAN NOTE
· Last use 10/7/2022 around 12-1 pm  · Follow suboxone MAT protocol for withdrawal management  · Apply butrans patch   · Patient is currently experiencing moderate- severe withdrawal symptoms of anxiety,agitation, piloerection, stomach cramps  · He is requesting to start low dose initiation of buprenorphine  · Will treat patient with symptomatic care until 24 hours and plan to begin low dose initiation of buprenorphine 0 5mg q6 hours x 4 doses this afternoon   · Continue to monitor symptoms- plan for micro-induction with suboxone once at least 24 hrs surpassed since last use   · Continue to monitor with supportive care   ·  continuous pulse ox

## 2022-10-08 NOTE — UTILIZATION REVIEW
Initial Clinical Review    Admission: Date/Time/Statement:   Admission Orders (From admission, onward)     Ordered        10/07/22 1613  INPATIENT ADMISSION  Once                      Orders Placed This Encounter   Procedures   • INPATIENT ADMISSION     Standing Status:   Standing     Number of Occurrences:   1     Order Specific Question:   Level of Care     Answer:   Med Surg [16]     Order Specific Question:   Estimated length of stay     Answer:   More than 2 Midnights     Order Specific Question:   Certification     Answer:   I certify that inpatient services are medically necessary for this patient for a duration of greater than two midnights  See H&P and MD Progress Notes for additional information about the patient's course of treatment  ED Arrival Information     Expected   -    Arrival   10/7/2022 13:43    Acuity   Emergent            Means of arrival   Walk-In    Escorted by   [facility] Toxicology    Admission type   Emergency            Arrival complaint   detox           Chief Complaint   Patient presents with   • Detox Evaluation     From heroine with fentanyl- last use today     • Foot Pain     Bilat - states having blisters on bottom of feet and toes  Is homeless, gets them wet and cannot change socks       Initial Presentation: 41 y o  male who presents seeking detox from fentanyl  Patient presented to [facility] ED 10/7/2022 seeking opioid detox, subsequently admitted to [facility] medical detox unit for medically-assisted opioid withdrawal  Patient reports he has been injecting 3-4 bags fentanyl daily  Injects primarily in b/l forearms, occasionally reuses needles but denies sharing needles  Denies injecting elsewhere, denies injecting into neck or lower extremities/feet  Reports last use was a few hours prior to arrival  Has been to inpatient rehab before, most recently in 2018   States he has never been on methadone or suboxone in the past  Reports he is currently interested in [facility] and inpatient rehab  Date/Time of Last Opioid Use: 10/7/2022   Plan: Inpatient admission for evaluation and treatment of opioid withdrawal, IVDU, polysubstance abuse: apply butrans patch, plan for micro-induction with suboxone once at least 24 hrs since last use, telemetry and continuous pulse ox, consult CM      ED Triage Vitals   Temperature Pulse Respirations Blood Pressure SpO2   10/07/22 1409 10/07/22 1409 10/07/22 1409 10/07/22 1409 10/07/22 1409   98 7 °F (37 1 °C) 91 18 119/70 99 %      Temp Source Heart Rate Source Patient Position - Orthostatic VS BP Location FiO2 (%)   10/07/22 1638 10/07/22 1638 10/07/22 1638 10/07/22 1638 --   Temporal Monitor Lying Right arm       Pain Score       10/07/22 1443       6          Wt Readings from Last 1 Encounters:   10/07/22 74 8 kg (165 lb)     Additional Vital Signs:     Date/Time Temp Pulse Resp BP MAP (mmHg) SpO2 O2 Device   10/08/22 1109 98 2 °F (36 8 °C) 77 16 133/82 99 99 % None (Room air)   10/08/22 0701 99 2 °F (37 3 °C) 98 16 115/70 85 95 % None (Room air)   10/08/22 0500 98 8 °F (37 1 °C) 68 16 127/80 -- 100 % None (Room air)   10/07/22 1914 97 9 °F (36 6 °C) 59 16 117/71 -- 96 % None (Room air)   10/07/22 1638 98 6 °F (37 °C) 74 18 100/58 -- 96 % None (Room air)   10/07/22 1633 -- -- -- -- -- 96 % None (Room air)       Pertinent Labs/Diagnostic Test Results:   No orders to display     Results from last 7 days   Lab Units 10/07/22  1447   SARS-COV-2  Negative     Results from last 7 days   Lab Units 10/08/22  0524 10/07/22  1447   WBC Thousand/uL 5 98 8 16   HEMOGLOBIN g/dL 13 7 13 2   HEMATOCRIT % 41 8 40 0   PLATELETS Thousands/uL 274 276   NEUTROS ABS Thousands/µL  --  5 54         Results from last 7 days   Lab Units 10/08/22  0524 10/07/22  1447   SODIUM mmol/L 136 137   POTASSIUM mmol/L 4 0 4 0   CHLORIDE mmol/L 108 104   CO2 mmol/L 25 26   ANION GAP mmol/L 3* 7   BUN mg/dL 16 20   CREATININE mg/dL 0 72 0 96   EGFR ml/min/1 73sq m 112 95 CALCIUM mg/dL 8 5 9 0   MAGNESIUM mg/dL 1 9 1 9     Results from last 7 days   Lab Units 10/07/22  1447   AST U/L 59   ALT U/L 38   ALK PHOS U/L 63   TOTAL PROTEIN g/dL 6 8   ALBUMIN g/dL 3 8   TOTAL BILIRUBIN mg/dL 0 83         Results from last 7 days   Lab Units 10/08/22  0524 10/07/22  1447   GLUCOSE RANDOM mg/dL 97 75           Results from last 7 days   Lab Units 10/07/22  1447   HEP B S AG  Non-reactive   HEP C AB  High Reactive*   HEP B C IGM  Non-reactive           Results from last 7 days   Lab Units 10/07/22  1625   AMPH/METH  Positive*   BARBITURATE UR  Negative   BENZODIAZEPINE UR  Negative   COCAINE UR  Positive*   METHADONE URINE  Negative   OPIATE UR  Negative   PCP UR  Negative   THC UR  Positive*         ED Treatment:   Medication Administration from 10/07/2022 1343 to 10/07/2022 1631       Date/Time Order Dose Route Action     10/07/2022 1443 cloNIDine (CATAPRES) tablet 0 2 mg 0 2 mg Oral Given     10/07/2022 1444 ondansetron (ZOFRAN) injection 4 mg 4 mg Intravenous Given     10/07/2022 1444 ketorolac (TORADOL) injection 15 mg 15 mg Intravenous Given        Past Medical History:   Diagnosis Date   • Hepatitis C      Present on Admission:  • Opioid use disorder, moderate, dependence (HCC)  • Opioid withdrawal (HCC)  • IVDU (intravenous drug user)  • Cigarette nicotine dependence  • Polysubstance abuse (HCC)  • Friction blisters of the soles      Admitting Diagnosis: Drug abuse (HCC) [F19 10]  Foot pain [M79 673]  Heroin abuse ([facility] ) [F11 10]  Opioid use disorder, moderate, dependence ([facility] ) [F11 20]  Age/Sex: 41 y o  male  Admission Orders:  Scheduled Medications:  buprenorphine, 0 5 mg, Sublingual, Q6H  enoxaparin, 40 mg, Subcutaneous, Daily  neomycin-bacitracin-polymyxin b, 1 large application, Topical, BID  nicotine, 1 patch, Transdermal, Daily  transdermal buprenorphine, 20 mcg, Transdermal, Q7 Days      Continuous IV Infusions:  sodium chloride, 75 mL/hr, Intravenous, Continuous      PRN Meds:  acetaminophen, 650 mg, Oral, Q6H PRN  clonazePAM, 1 mg, Oral, Q6H PRN  cloNIDine, 0 2 mg, Oral, Q6H PRN  gabapentin, 300 mg, Oral, Q8H PRN  ondansetron, 4 mg, Intravenous, Q4H PRN        IP CONSULT TO CASE MANAGEMENT    Network Utilization Review Department  ATTENTION: Please call with any questions or concerns to [phone] and carefully listen to the prompts so that you are directed to the right person  All voicemails are confidential   Candi Taylor all requests for admission clinical reviews, approved or denied determinations and any other requests to dedicated fax number below belonging to the campus where the patient is receiving treatment   List of dedicated fax numbers for the Facilities:  [address] DENIALS (Administrative/Medical Necessity) [phone]   [address] (Maternity/NICU/Pediatrics) [phone]   913 Shayla Ochoa [phone]   [facility] [phone]   1306 [facility] [address] [facility] [place] [address] Horacio Orr [address] [phone]   [address] [place] [address] [facility] [address] [phone]

## 2022-10-09 PROBLEM — F11.93 OPIOID WITHDRAWAL (HCC): Status: RESOLVED | Noted: 2022-10-07 | Resolved: 2022-10-09

## 2022-10-09 PROCEDURE — 99232 SBSQ HOSP IP/OBS MODERATE 35: CPT

## 2022-10-09 RX ORDER — BUPRENORPHINE AND NALOXONE 8; 2 MG/1; MG/1
8 FILM, SOLUBLE BUCCAL; SUBLINGUAL ONCE
Status: DISCONTINUED | OUTPATIENT
Start: 2022-10-09 | End: 2022-10-10 | Stop reason: HOSPADM

## 2022-10-09 RX ADMIN — BUPRENORPHINE AND NALOXONE 8 MG: 8; 2 FILM BUCCAL; SUBLINGUAL at 00:44

## 2022-10-09 RX ADMIN — BUPRENORPHINE AND NALOXONE 8 MG: 8; 2 FILM BUCCAL; SUBLINGUAL at 21:35

## 2022-10-09 RX ADMIN — ONDANSETRON 4 MG: 2 INJECTION INTRAMUSCULAR; INTRAVENOUS at 00:44

## 2022-10-09 RX ADMIN — GABAPENTIN 300 MG: 300 CAPSULE ORAL at 00:44

## 2022-10-09 RX ADMIN — NICOTINE 1 PATCH: 21 PATCH, EXTENDED RELEASE TRANSDERMAL at 09:22

## 2022-10-09 RX ADMIN — BUPRENORPHINE AND NALOXONE 8 MG: 8; 2 FILM BUCCAL; SUBLINGUAL at 09:21

## 2022-10-09 RX ADMIN — ACETAMINOPHEN 650 MG: 325 TABLET, FILM COATED ORAL at 02:06

## 2022-10-09 NOTE — ASSESSMENT & PLAN NOTE
· Reports IVDU with fentanyl  · Injects primarily in b/l forearms; denies injecting in neck, lower extremities   · Track marks evident without acute signs of infection   · Admits to reusing needles, denies sharing needles    · Hepatitis A IgM- high reactive- patient reports risk factors of homelessness and IVDU  · GI consult   · Hepatitis C antibody- high reactive- patient endorses history of chronic hep C   · Encourage cessation

## 2022-10-09 NOTE — ASSESSMENT & PLAN NOTE
· Last use 10/7/2022 around 12-1 pm  · Patient underwent micro-induction of low dose buprenorphine on 10/8/22  During induction patient received a total of 16 mg of suboxone before transitioning to maintenance dosing of 8/2 mg buprenorphine/ naloxone bid  · Patient currently does not exhibit further withdrawal symptoms  · Remove transdermal buprenorphine patch

## 2022-10-09 NOTE — DISCHARGE INSTR - OTHER ORDERS
Drug and Alcohol Resources in [facility]    If you have health insurance, including medical assistance, there should be a phone number on your insurance card that you can call to find out how to access services  The card may say, “For [address] [place] or “For Drug and Alcohol Services” or “For Substance Abuse Services” call the number provided  [place] [address] Alcohol Division  [facility] Val[facility], Buzz, Armando1 Ethan Yin  [phone]  A  is available Monday through Friday from 8:00 am to 5:00 pm to provide you with assistance on accessing services for substance abuse  If you do not have health insurance and are in financial need, this office may also help you get the funding for the services that are necessary  24 Massiel Cruz [facility] provides funding to support three Recovery Centers in [facility]  These centers offer a safe, sober environment to those in recovery  A variety of programming including 12-Step Meetings, Terrence [place], Life Skills Workshops, etc  is offered at each location  [address] [place] [address]  [phone]  www  cleanslatebangor  org Change on Main  [facility] EXTENDED CARE, 1541 [facility]  [phone]  qaqkgw-fp-vheq    Keny[place]acarol [address] [facility] [facility] Hospital[facility] 44, 210 [facility]  [phone]   [address] [place] [address]  [phone]  www  juni  [facility], [address] [place] [address]  [phone]  [facility] 77  Confidential free help, from public health agencies, to find substance use treatment and information  [phone]    Link for Zoom Codes for Virtual 12 step Meetings  Cindy gallegos uk  aspx  Alcoholics Anonymous  [facility]  [phone]    http://www lynn com/  Narcotics Anonymous  [phone]  https://Light Up [place]/    Mental health resources in [facility]    45 [facility], 791 Melis   Phone: [phone]    Crisis Intervention number: 2-077-122-798-340-3518    Prevent suicide PA: In Crisis? Call    1-543-053-WXHR (5903)    National Suicide Prevention Lifeline: 6-440-878-834-180-4263    Vilma Arreola 98:   5034 [facility], 210 [facility]  [address] [facility] 90 West  1401 [facility], 216 [facility]  [address], [address]  [address]  8225 Warren Ochoa , Jerod Nelson AndreaSalinas Valley Health Medical Centermarcos 3, [address]  [phone]    [facility]  61633 [facility], [address]  OSLO, 960 [facility]  2 [place]:   Obtain a housing voucher at the [place] station at [employer]  Photo identification will be required  Come to the [address] [place] [address] O between 3:30 P  M  and 7:30 P M  (TheJobPost is served at [employer] P M )  Para poder ser [employer] en esta MISSION tienes que hacer lo siguiente:  Tienes que obtener un boleto (voucher) de en la estacion de policia [place] [address] [address]  Iris identificacion con retrato es requerida    La entrada a la [place] es de 3:30 P M  Y 7:30 P M  (La comida sera servida a las 5:30 P M )      Local Food Bank Locations & Contact Information:  BARBARA Tuttle [address] [place] [address]  Municipalities:  [facility], 820 [facility], [facility], [address] [place] [address], [place], and [facility]  Emergency Food Pantries  [place] [address]  [facility]  Address: Jerod Chiragsaurav Do Chavez 574, GIANNIshantel Jerod Albright 5483  Phone: [phone]  Hours of Operation: Fridays 10:00AM-1:00PM  Seventh Day [facility]  Address: 1200 [facility], [place], [address]  Phone: [phone]  Hours of Operation: Thursdays 5:30PM-6:30PM    [address] [place] [address] [address]  Address: [address], [place], 98 David Colunga  Phone: [phone]  Hours of Operation: Monday-Friday 9:30AM-12:00PM  [facility]  Address: 119 Massiel Desai, [place], 98 David Colunga  Phone: [phone]  Hours of Operation: By appointment -- Mondays, Tuesdays, Thursdays, & Fridays 8:30AM-4:00PM;  Wednesdays 8:30AM-12:00PM  [facility]  Address: 4200 [facility], Deandrehöann 98 David Colunga  Phone: [phone]  Hours of Operation: 1st & 3rd Saturdays 10:00AM-12:00PM  [facility]  Address: [address], [place], 98 David Colunga  Phone: [phone]  Hours of Operation: 2nd & 4th Thursdays 4:00PM-5:30PM (Only 4th Thursdays during the summer)  [facility]  Address: 1300 [facility], [place], 98 David Colunga  Phone: [phone]  Hours of Operation: By appointment -- Wednesdays 6:00PM  [facility]  Address: 326 [facility], GallitoksAsad todd  Phone: [phone]  Hours of Operation: Thursdays 11:00AM-2:00PM or By appointment  [facility]  Address: [address] Rom[address] Mal[address] , [place], 98 David Colunga  Phone: [phone]  Hours of Operation: Saturdays 9:00AM-11:30AM  [facility]  Address: 2295 [facility], Asad Barakat  Phone: [phone]  Hours of Operation: Fridays, 3rd & 4th Saturdays 9:00AM-11:00AM  [address]  Address: 1703 [facility], GallitoksAsad todd  Phone: [phone]  Hours of Operation: Tuesdays 3:00PM-5:00PM or By appointment  [facility]  Address: Aba, Yuval, 98 [facility]  Phone: [phone]  Hours of Operation: By appointment  [facility]  Address: [address] Ochoa, [place], [address] [place] [address]  Phone: [phone]  Hours of Operation: 3rd Saturday, 8:00AM-11:00AM  RIPPLE  Address: [address], [place] [address]  Phone: [phone]  Hours of Operation: 3rd Sunday 4:00PM-5:30PM    [address] [place] [facility])  Address: [address], [place], 98 [facility]  Phone: [phone]  Hours of Operation: 3rd Wednesday 9:00AM-4:00PM  [facility]  Address: [address], [place], 98 [facility]  Phone: [phone]  Hours of Operation: 1st & 3rd Saturdays 9:00AM-11:30AM  [address]  Address: 503 N [facility], [place], 98 [facility]  Phone: [phone]  Hours of Operation: By appointment  [address] Pantry  Address: [address], [place], [address]  Phone: [phone]  Hours of Operation: 3rd Sunday 12:00PM-1:30PM  Doug 218 [facility]  Address: [address], [place], [address]  Phone: [phone]  Hours of Operation: 1st & 3rd Thursdays 6:30PM-7:30PM; By appointment -- Mondays  [facility]  Address: [address] Santoyo, [place], [address]  Phone: [phone]  Hours of Operation: By appointment  [facility]  Address: [address], [place], [address]  Phone: [phone]  Hours of Operation: 1st & 3rd Wednesdays 4:00PM-5:30PM  [facility]  Address: 815 [facility], [place], [address]  Phone: [phone]  Hours of Operation: 4th Wednesday 6:30PM-7:30PM  [address] Open Door Pantry  Address: [address], [place], [address]  Phone: [phone]  Hours of Operation: 2nd Tuesday 10:00AM-12:00PM; 4th Thursday 4:00PM-6:00PM  [address])  Address: [place], Bipin U  56 , [address]  Phone: [phone]  Hours of Operation: By appointment -- Monday-Friday 9:00AM-5:00PM  [address]  Address: [address] Yuval Espana, [address] [place] [address]  Phone: [phone]  Hours of Operation: 1st & 3rd Tuesdays 9:00AM-10:30AM; Thursdays 6:00PM-8:00PM    [employer]  Address: [place], [place], [address] [place] [address]  Phone: [phone]  Hours of Operation: Every other Saturday 10:00AM-12:00PM  [facility]  Address: 545 [facility], Roseanna, 120 [facility]  Phone: [phone]  Hours of Operation: 3rd Monday 6:00PM-7:30PM  [address] [facility]  Address: [address] Emmie, Yuval, [address] [place] [address]  Phone: [phone]  Hours of Operation: 4th Sunday 9:00AM-11:00AM  First [address], Jaret  Address: 241 [facility], Yuval, [address] [place] [address]  Phone: [phone]  Hours of Operation: Saturdays 10:30AM-12:30PM  [facility]  Address: MayCity Emergency Hospitalkaur, Yuval, [address] [place] [address]  Phone: [phone]  Hours of Operation: By appointment -- Hetal Barrow 8:30AM-4:30PM  [facility] Pantry  Address: [address] [facility] [address], [address]  Phone: [phone]  Hours of Operation: 1st & 3rd Wednesdays [address] [place] [address]  Address: [address]  Phone: [phone]  Hours of Operation: Wednesdays & Fridays 3:00PM-4:00PM  [address] Pantry  Address: [address] [place] [address], DANNY JIMENEZ  [place], [address]  Phone: [phone]  Hours of Operation: Every other Saturday 10:30AM-12:30PM  [address]  Address: Tuyet 812, 820 [facility], 5601 [facility]  Phone: [phone]  Hours of Operation: Tuesdays & Wednesdays 10:00AM-2:00PM; Closed last week of the month  [facility] - [address] [facility] [employer]  Address: Rick Tapia, [facility], [address] [address]  Phone: [phone]  Hours of Operation: 1st Saturday 9:00AM-12:00PM; 3rd Thursday 4:00PM-7:00PM    [address] Lisa Arley  Address: [facility], [address] [place] [address], [address] [place] [address]  Phone: [phone]  Hours of Operation: 3rd Monday & 3rd Wednesday 4:00PM-6:00PM; 3rd Saturday 10:00AM-12:00PM  35[address]cho[address]  Address: [address] [place] [address] Kirstin Yin, 10858 [facility]  Phone: [phone]  Hours of Operation: 1st & 3rd Mondays 9:00AM-11:30AM  [address] [facility]  Address: [address]  Phone: [phone]  Hours of Operation: 2nd Saturday 9:00AM-11:00AM  [address] [facility]  Address: [address], Kristel, 23 Massiel [address]  Phone: [phone]  Hours of Operation: 1st, 2nd, & 3rd Thursdays 4:00PM-7:00PM; Last Saturday 9:30AM-12:00PM  [place] [facility]  Address: [address] [place] [address] 74, [address] [place]   Phone: [phone]  Hours of Operation: Tuesdays 6:00PM-7:00PM; Saturdays 4:00PM-5:00PM; Sundays 12:30PM-1:30PM  [place] [employer]ry  Address: 07200 [facility] 55, [address] [place]   Phone: [phone]  Hours of Operation: By appointment -- Mondays 6:00PM      [address] [place] [address]  Address: [address] [place] [address], [address]  Phone: [phone]  Hours of Operation: Monday-Friday 1:30PM-2:30PM  [facility]  Address: [address] [place] [address], [address]  Phone: [phone]  Hours of Operation: Monday-Friday 9:00AM-5:00PM  [facility] [employer]  Address: [address] [place] [address], [address]  Phone: [phone]  Hours of Operation: Tuesday-Thursday 12:00PM-1:00PM    [address] [place] [address]  Address: [address] [place] [address], [address]  Phone: [phone]  Hours of Operation: Sundays 8:00AM-9:00AM; Some holidays  [address] [place] [address]:  Buzz Johnson, LO, [place], Pen Bg, Elieser, and Nika 49  Address: [address] [place] [address],12Th Floor, [facility]  Phone: [phone]  Hours of Operation: 2nd Tuesday 10:00AM-12:00PM  [address] [place] [address],[address] [place] [address]  Address: Buzz Atkins, Nano [employer]  Phone: [phone]  Hours of Operation: Wednesdays 10:00AM-12:00PM  [facility]  Address: [address], 210 [employer]  Phone: [phone]  Hours of Operation: 1st & 3rd Tuesdays 5:00PM-6:00PM  [facility]  Address: Via [facility] 102, [place], [place], [address]  Phone: [phone]  Hours of Operation: Tuesdays 6:00PM-7:00PM  1801 [facility]  Address: [address] Buzz Sahu, [address]  Phone: [phone]  Hours of Operation: 1st & 2nd Saturdays 12:00PM- 4:00PM  [facility] Pantry  Address: [address], 210 LudinVal[facility] Cent[facility]enesis aime  Phone: [phone]  Hours of Operation: Monday-Friday 10:30AM-11:30AM  4777 [facility]  Address: 1530 [facility] 90 [place]Buzz, 210 Nelia aime  Phone: [phone]  Hours of Operation: 3rd & 4th Saturdays 9:00AM-11:00AM; [facility] residents only    [facility]  Address: 1000 [facility] Gen Gerber 3  Phone: [phone]  Hours of Operation: 2nd Thursday 10:00AM-12:00PM  400 Franchesca Dillon [facility] Pant  Address: 900 [facility] Gen Gebrer 3  Phone: [phone]  Hours of Operation: Monday & Tuesday of the first full week of the month 9:00AM-11:00AM  [facility]  Address: 1 [facility] Gen Gerber 3  Phone: [phone]  Hours of Operation: Mondays, Wednesdays, & Thursdays 9:30AM-11:30AM  [address]  Address: 5980 [facility] Gen Gerber 3  Phone: [phone]  Hours of Operation: 2nd & 4th Saturdays 10:00AM-12:00PM  Buzz Bocanegra  14  [facility]  Address: NATASHA Pineda [address] Buzz, [address]   Phone: [phone]  Hours of Operation: By appointment -- Tuesdays & Wednesdays 10:00AM-4:00PM, Thursdays 10:00AM-  12:00PM, & Sundays 4:00PM-7:00PM  [facility]  Address: [facility] Amanda Olea [address]  Phone: [phone]  Hours of Operation: 3rd Saturday 10:00AM-12:00PM  [address] [place] [address]  Address: Grant Villarreal Buzz [address]  Phone: [phone]  Hours of Operation: 3rd Saturday 9:00AM-11:30AM or by john Johnston [facility]  Address: [address], Buzz, [address]  Phone: [phone]  Hours of Operation: 2nd & 4thTuesdays 10:00AM-12:00PM  [facility] Pantry  Address: Silvano  49, Buzz, [address]  Phone: [phone]  Hours of Operation: 1st, 2nd, & 4th Wednesdays 11:00AM-1:00PM; 3rd Wednesday [address] [address] [facility] Pantry  Address: Rodney Contreras 69, Buzz, [address]  Phone: [phone]  Hours of Operation: Wednesdays 10:00AM-12:00PM; Last Wednesday 6:00PM-8:00PM  [facility]  Address: [facility], 4[address] [employer] [place]  Phone: [phone]  Hours of Operation: Fridays 8:30AM-11:30AM & 1:30PM-3:30PM    [facility]  Address: ANDERS Marti [address] [place] [address], [address] [employer] [place]  Phone: [phone]  Hours of Operation: By appointment--Mondays-Fridays 9:00AM -4:30PM   [facility]  Address: 100 [facility], 44 [employer] [place]  Phone: [phone]  Hours of Operation: 1st & 3rd Tuesdays 6:00PM-7:30PM  [facility]  Address: 1013 [facility], 44 [employer] [place]  Phone: [phone]  Hours of Operation: By appointment -- Mondays-Fridays 9:00AM-5:00PM  [facility]  Address: 230 [facility], [address] [employer] [place]  Phone: [phone]  Hours of Operation: 3rd, 4th, & 5th Thursdays 5:00PM-7:00PM  [address]  Address: [address]60 [facility], [address] [employer] [place]  Phone: [phone]  Hours of Operation: Mondays 10:00AM-12:30PM; Thursdays 10:00AM-12:30PM & 1:00PM-3:30 PM  [facility]  Address: 721 Bojorquez [facility], 4 [address] [place], 4420 [facility], [place], operated by [facility] [place]  Phone: [phone]  Hours of Operation: Tuesdays 5:00PM-6:00PM  [address] [place] [address]  Address: WILIAM Munson, [address] [place] [address]  Phone: [phone]  Hours of Operation: Thursdays 6:00PM-7:30PM; Closed 5th Thursday  [address]  Address: 1100 Pema Granado, 6019 [facility]  Phone: [phone]  Hours of Operation:  For last names beginning with A-M: 2nd Tuesday 8:00AM-10:00AM or 6:00PM-7:00PM;  For last names beginning with N-Z: 2nd Wednesday 8:00AM-10:00AM  [address] [place]  Address: Darlene Green, [address]  Phone: [phone]  Hours of Operation: Wednesdays 9:30AM-12:00PM; 1st, 2nd, & 3rd Thursdays 6:00PM-8:00PM; 2nd & 3rd Saturdays 9:30AM-12:00PM  [place] - [address]  [facility]  Address: 703 [facility], 205 [facility], [address]  [address]  Phone: [phone]  Hours of Operation: 3rd Saturday 9:00AM-11:00AM    [facility] [place]  Address: 201 [facility], 205 [facility], [address]  Phone: [phone]  Hours of Operation: Tuesdays 10:00AM-2:00PM  [place]  Address: [facility], 205 [facility], 500 [address]  [address]  Phone: [phone]  Hours of Operation: Tuesdays 10:00AM-12:00PM; Closed 5th Tuesday  [place] - [address]  PUMP  Address: [address]  Phone: [phone]  Hours of Operation: Mondays 11:00AM-12:30PM & 7:00PM-8:30PM  [place] - [address] Pantry  Address: [address] [facility], [address]  Phone: [phone]  Hours of Operation: Mondays 5:00PM-7:00PM  [address] /StLonne Blades  Address: 424 [facility], [address]  Phone: [phone]  Hours of Operation: 2nd & 4th Saturdays 9:00AM-10:00AM  [address] [facility] of Belford Roxo  Address: 2101 [facility], [facility]  Phone: [phone]  Hours of Operation: 2nd Saturday - Lunch (Call for times)  [facility]  Address: NATASHA Pineda [facility], Buzz, Shawn3 [address]  Phone: [phone]  Ours of Operation: Sundays 1:00PM-2:00PM  [facility]  Address: 700 [facility] VinelandBuzz, Shawn [address]  Phone: [phone]  Hours of Operation: Saturdays 12:00PM-1:00PM  JIGNESH Briones [facility]  Address: [address]  Phone: [phone]  Hours of Operation: Monday-Friday 8:00AM-4:00PM  [facility]  Address: Buzz Herrera, [address]   Phone: [phone]  Hours of Operation: Monday-Friday 12:00PM-1:00PM    [facility]  Address: 1100 [facility], [address] [place] [address]  Phone: [phone]  Hours of Operation: Call for [facility]  Address: [address] [place] [address], [address] [place] [address]  Phone: [phone]  Hours of Operation: Monday-Friday 12:00PM-1:00PM

## 2022-10-09 NOTE — PROGRESS NOTES
Progress Note - Medical Toxicology    Henok Graves 41 y o  male MRN: [id number]  Unit/Bed#: 5T DETOX 757-97 Encounter: [id number]  MEDICAL DETOX UNIT, LEVEL 4  Department of Medical Toxicology  Reason for Admission/Principal Problem: opioid withdrawal   Rounding Provider: Rae Lee MD         * Opioid withdrawal (HCC)-resolved as of 10/9/2022  Assessment & Plan  · Last use 10/7/2022 around 12-1 pm  · Patient underwent micro-induction of low dose buprenorphine on 10/8/22  During induction patient received a total of 16 mg of suboxone before transitioning to maintenance dosing of 8/2 mg buprenorphine/ naloxone bid  · Patient currently does not exhibit further withdrawal symptoms  · Remove transdermal buprenorphine patch  Opioid use disorder, moderate, dependence ([facility] )  Assessment & Plan  Reports currently injects heroin/fentanyl, 3-4 bags daily x several months   Last use 10/7/2022 a few hrs prior to presentation (around noon-1 pm)  Has been to rehab in the past- currently interested in rehab upon discharge- patient is medically stable from a withdrawal perspective     · Continue maintenance dosing     Friction blisters of the soles  Assessment & Plan  · Presents with multiple blisters of soles of feet  · Reports he is homeless and walks frequently; was recently in damp shoes   · Reports discomfort of feet bilaterally especially with ambulation  · On exam: multiple blisters seen on soles of feet; open blister noted on left 5th digit; no evidence of diffuse erythema, no discharge, no maceration    · Continue to monitor with supportive care  · Encourage good hygiene, keep feet clean and dry     Polysubstance abuse ([facility] )  Assessment & Plan  · Reports he occasionally smokes marijuana and meth  · Denies use of cocaine, alcohol, benzodiazepines   · Most recent use a few days ago   · UDS + amph/meth, cocaine, THC  · Encourage cessation     Cigarette nicotine dependence  Assessment & Plan  Current every day smoker: 0 75-1 ppd   Encourage cessation  Offer nicotine replacement    IVDU (intravenous drug user)  Assessment & Plan  · Reports IVDU with fentanyl  · Injects primarily in b/l forearms; denies injecting in neck, lower extremities   · Track marks evident without acute signs of infection   · Admits to reusing needles, denies sharing needles  · Hepatitis A IgM- high reactive- patient reports risk factors of homelessness and IVDU  · GI consult   · Hepatitis C antibody- high reactive- patient endorses history of chronic hep C   · Encourage cessation           VTE Pharmacologic Prophylaxis:   Pharmacologic: Enoxaparin (Lovenox)  Mechanical VTE Prophylaxis in Place: no    Code Status: Level 1 - Full Code    Patient Centered Rounds: I have performed bedside rounds with nursing staff today  Discussions with Specialists or Other Care Team Provider: Case Management, Dr Jarrett Donato    Education and Discussions with Family / Patient: I personally answered all of the patient's questions to the best of my ability    Time Spent for Care: 30 minutes  More than 50% of total time spent on counseling and coordination of care as described above  Current Length of Stay: 2 day(s)    Current Patient Status: Inpatient     Certification Statement: The patient will continue to require additional inpatient hospital stay due to GI consult Discharge Plan: Anticipated Discharge within 24-48 hours       Subjective:   Patient seen and examined this morning, patient denies current withdrawal symptoms  Provider went over laboratory hepatitis panel results with patient  He endorsed a history of hepatitis C with treatment  Patient does report risk factors for hepatitis A as homelessness and IVDU  Patient denies any fever chills, diarrhea, nausea, vomiting, dark urine, or loss of appetite  He is still interested in entering inpatient drug and alcohol rehab       Objective:     Clinical Opiate Withdrawal Scale  Pulse: 67  Resting Pulse Rate: Measured After Patient is Sitting or Lying for One Minute: Pulse rate 80 or below  GI Upset: Over Last Half Hour: No GI symptoms  Sweating: Over Past Half Hour Not Accounted for by Room Temperature of Patient Activity: No report of chills or flushing  Tremor: Observation of Outstretched Hands: No tremor  Restlessness: Observation During Assessment: Able to sit still  Yawning: Observation During Assessment: No yawning  Pupil Size: Pupils pinned or normal size for room light  Anxiety and Irritability: None  Bone or Joint Aches: If Patient was Having Pain Previously, Only the Additional Component Attributed to Opiate Withdrawal is Scored: Not present  Gooseflesh Skin: Skin is smooth  Runny Nose or Tearing: Not Accounted for by Cold Symptoms or Allergies: Not present  Clinical Opiate Withdrawal Scale Total Score: 0    No data recorded      Last 24 Hours Medication List:   Current Facility-Administered Medications   Medication Dose Route Frequency Provider Last Rate   • acetaminophen  650 mg Oral Q6H PRN Amna Cardoza PA-C     • buprenorphine-naloxone  8 mg Sublingual BID CODY Roman     • buprenorphine-naloxone  8 mg Sublingual Once [employer]     • enoxaparin  40 mg Subcutaneous Daily Amna Cardoza PA-C     • gabapentin  300 mg Oral Q8H PRN Amna Cardoza PA-C     • neomycin-bacitracin-polymyxin b  1 large application Topical BID Amna Cardoza PA-C     • nicotine  1 patch Transdermal Daily Amna Cardoza PA-C     • ondansetron  4 mg Intravenous Q4H PRN Tiffanie Allison PA-C           Vitals:   Temp (24hrs), Av 7 °F (37 1 °C), Min:98 3 °F (36 8 °C), Max:99 1 °F (37 3 °C)    Temp:  [98 3 °F (36 8 °C)-99 1 °F (37 3 °C)] 99 1 °F (37 3 °C)  HR:  [60-82] 67  Resp:  [16-18] 16  BP: (125-138)/(75-93) 137/85  SpO2:  [96 %-99 %] 99 %  Body mass index is 22 38 kg/m²       Input and Output Summary (last 24 hours):No intake or output data in the 24 hours ending 10/09/22 1815    Physical Exam:   Physical Exam  Constitutional:       General: He is not in acute distress  Appearance: He is not diaphoretic  Eyes:      General: No scleral icterus  Cardiovascular:      Rate and Rhythm: Normal rate and regular rhythm  Heart sounds: No murmur heard  Pulmonary:      Effort: No respiratory distress  Breath sounds: Normal breath sounds  Abdominal:      General: Bowel sounds are normal  There is no distension  Palpations: Abdomen is soft  Tenderness: There is no abdominal tenderness  Skin:     General: Skin is warm  Coloration: Skin is not jaundiced  Neurological:      Mental Status: He is oriented to person, place, and time  Psychiatric:         Mood and Affect: Mood is anxious  Behavior: Behavior is cooperative  Additional Data:     Labs: keep all most recent labs as listed on admission templates   Results from last 7 days   Lab Units 10/08/22  0524 10/07/22  1447   WBC Thousand/uL 5 98 8 16   HEMOGLOBIN g/dL 13 7 13 2   HEMATOCRIT % 41 8 40 0   PLATELETS Thousands/uL 274 276   NEUTROS PCT %  --  67   LYMPHS PCT %  --  18   MONOS PCT %  --  11   EOS PCT %  --  3      Results from last 7 days   Lab Units 10/08/22  0524 10/07/22  1447   SODIUM mmol/L 136 137   POTASSIUM mmol/L 4 0 4 0   CHLORIDE mmol/L 108 104   CO2 mmol/L 25 26   BUN mg/dL 16 20   CREATININE mg/dL 0 72 0 96   ANION GAP mmol/L 3* 7   CALCIUM mg/dL 8 5 9 0   ALBUMIN g/dL  --  3 8   TOTAL BILIRUBIN mg/dL  --  0 83   ALK PHOS U/L  --  63   ALT U/L  --  38   AST U/L  --  59   GLUCOSE RANDOM mg/dL 97 75                              * I Have Reviewed All Lab Data Listed Above  * Additional Pertinent Lab Tests Reviewed: [facility] Admission Reviewed      Imaging Studies: I have personally reviewed pertinent reports  Recent Cultures (last 7 days):           Today, Patient Was Seen By: Laura Agrawal    ** Please Note: Dictation voice to text software may have been used in the creation of this document   **

## 2022-10-09 NOTE — NURSING NOTE
Entered patient's room to find the continuous pulse ox removed from patient's finger and laying on bedside table  Patient refuses to let this nurse reapply continuous pulse ox at this time  Damaris Bias NP aware

## 2022-10-09 NOTE — PROGRESS NOTES
10/09/22 0738   Referral Data   Referral Source Patient   Referral Name Self   Referral Reason Drug/Alcohol [address] [facility] [place]   Readmission Root Cause   30 Day Readmission No   Patient Information   Mental Status Alert   Primary Caregiver Self   Accompanied by/Relationship Self   Support System Friends   Legal Information   Legal Issues Pt denies   Activities of Daily Living Prior to Admission   Functional Status Independent   Assistive Device No device needed   Living Arrangement Homeless   Ambulation Independent   Access to Firearms   Access to Firearms No  (pt denies)   Income [address] [place] Unemployed   [place] of Transport to Appts: [employer] [facility]     Pt was informed of case management role and the purpose of completion of intake with case management  Pt was tired during intake assessment and was falling in and out of sleep; CM attempted to gather as much information from pt as possible at this time  Pt reports that he has been using approximately 3-4 bags of heroin daily for the last several months; pt reports first use at age 34 and last use on 10/7/2022 of 4 bags  Pt unable to identify any substantial period of abstinence from heroin use  When CM explored polysubstance use as his UDS was positive for Amphetamine/Methamphetamine/Cocaine/THC, pt declined to provide any information regarding his use  Pt reports various inpatient and outpatient substance use treatment; most recent rehab in 2018  Pt denies any current withdrawal symptoms  Pt declined to answer questions regarding family history of substance use  AUDIT: No score   PAWSS: 0  UDS (+) for: Amphetamines/Methamphetamines, Cocaine, THC  Ethanol level in ED: Not completed     Pt denies any mental health concerns; pt denies any previous inpatient/outpatient mental health treatment  Pt denies SI/HI; denies history of abuse; denies access to firearms   Pt declined to answer questions regarding family history of mental health diagnoses  Pt reports that he does not have a PCP; states that he has no insurance  Pt denies any current legal issues  Pt reports that he completed high school; states that he is unemployed  Pt denies receiving any government assistance  Pt reports that his main mode of transportation is walking  Pt denies  service  Pt reports that he has been homeless for years  Pt declined to sign ROIs for any supportive contacts  Pt declined to participate in relapse prevention plan due to being tired; CM will attempt to complete this with pt when he is more awake  Pt provided verbal consent for CM to reach out to [facility] program to set up potential admission to inpatient substance use treatment; CM faxed referral information to [facility]  Pt presents in the contemplation stage of change

## 2022-10-09 NOTE — CASE MANAGEMENT
CM informed by medical team that pt will not be medically cleared for discharge today; CM will follow up with referral to [facility] tomorrow 10/10/2022

## 2022-10-09 NOTE — ASSESSMENT & PLAN NOTE
Reports currently injects heroin/fentanyl, 3-4 bags daily x several months   Last use 10/7/2022 a few hrs prior to presentation (around noon-1 pm)  Has been to rehab in the past- currently interested in rehab upon discharge- patient is medically stable from a withdrawal perspective     · Continue maintenance dosing

## 2022-10-10 VITALS
OXYGEN SATURATION: 97 % | HEART RATE: 73 BPM | BODY MASS INDEX: 22.35 KG/M2 | RESPIRATION RATE: 16 BRPM | DIASTOLIC BLOOD PRESSURE: 89 MMHG | SYSTOLIC BLOOD PRESSURE: 144 MMHG | HEIGHT: 72 IN | WEIGHT: 165 LBS | TEMPERATURE: 97.9 F

## 2022-10-10 PROBLEM — K75.9 HEPATITIS: Status: ACTIVE | Noted: 2022-10-10

## 2022-10-10 PROCEDURE — 99222 1ST HOSP IP/OBS MODERATE 55: CPT | Performed by: PHYSICIAN ASSISTANT

## 2022-10-10 PROCEDURE — 99239 HOSP IP/OBS DSCHRG MGMT >30: CPT

## 2022-10-10 RX ADMIN — BUPRENORPHINE AND NALOXONE 8 MG: 8; 2 FILM BUCCAL; SUBLINGUAL at 09:37

## 2022-10-10 RX ADMIN — NICOTINE 1 PATCH: 21 PATCH, EXTENDED RELEASE TRANSDERMAL at 09:37

## 2022-10-10 NOTE — NURSING NOTE
Patient refuse RUQ US stating he has to leave because his mother is dying  Provider made aware and spoke with patient  Patient is being discharged

## 2022-10-10 NOTE — CASE MANAGEMENT
Case Management Discharge Planning Note    Patient name Alexander Berry  Location 5T DETOX 513/5T DETOX 51* MRN [id number]  : 1975 Date 10/10/2022       Current Admission Date: 10/7/2022  Current Admission Diagnosis:Opioid use disorder, moderate, dependence ([facility] )   Patient Active Problem List    Diagnosis Date Noted   • Opioid use disorder, moderate, dependence ([facility] ) 10/07/2022   • IVDU (intravenous drug user) 10/07/2022   • Cigarette nicotine dependence 10/07/2022   • Polysubstance abuse ([facility] ) 10/07/2022   • Friction blisters of the soles 10/07/2022      LOS (days): 3  Geometric Mean LOS (GMLOS) (days):   Days to GMLOS:     OBJECTIVE:  Risk of Unplanned Readmission Score: 12 17         Current admission status: Inpatient   Preferred Pharmacy:   [facility] ,  [facility] ,  [address] [place] [address]  Phone: [phone] Fax: [phone] [address]  Phone: [phone] Fax: [phone]    Primary Care Provider: No primary care provider on file  Primary Insurance:   Secondary Insurance:     DISCHARGE DETAILS: Pt indicated he was leaving without discharge planning  Cm highlighted the need for a Suboxone MAT appointment  Pt stated he would arrange this himself and was interested in continuing suboxone  Pt was agreeable to assistance with acquiring zubsolv from [facility] and stated he would walk to hospitals himself  Cm contacted [facility] and faxed two zubsolv coupons to [facility] to pay for pt's zubsolv  Pt left unit without AVS and stated he would retreive his own medications from [address] [place], [place] [address]      Discharge planning discussed with[de-identified] patient  Freedom of Choice: Yes                   Contacts  Patient Contacts: [facility]  Relationship to Patient[de-identified] Treatment Provider  Reason/Outcome: Continuity of Care, Discharge Planning              Other Referral/Resources/Interventions Provided:  Referrals Provided[de-identified] Therapist, Support Group, IOP, Crisis Hotline (inpatient VIDAL tx)    Would you like to participate in our [address] service program?  : Yes                                              Family notified[de-identified] No ANGUS's signed

## 2022-10-10 NOTE — NURSING NOTE
Writer saw patient walk by to elevator  Writer spoke to patient and the patient did not want to wait for his paperwork, nor to hear about any referrals, or medication prescriptions  Writer informed patient of the importance of his paperwork  Patient still refused to wait and left  Provider notified

## 2022-10-10 NOTE — CONSULTS
Patient MRN: [id number]  Date of Service: 10/10/2022  Referring Provider: Leela Christopher PA-C  Provider Creating Note: Clinton Matthew PA-C  PCP: No primary care provider on file  Reason for Consult: Hepatitis A  IVDU    HISTORY OF PRESENT ILLNESS:  Jerome Pereira is a 41 y o  homeless male with history of untreated Hepatitis C diagnosed ? 2019, ongoing polysubstance abuse (marijuana, meth, fentanyl) admitted 10/7 for opioid withdrawal  Labs revealed positive Hepatitis A IgM and Hepatitis C Ab  Hepatitis C viral load pending  Liver enzymes on admission normal  Synthetic liver function appear intact with normal platelets, albumin  No coags for review  HIV negative  Patient does complain of extreme fatigue for the past couple months  Denies abdominal pain, nausea, vomiting, anorexia, fatigue, jaundice, dark urine, skin rash, arthralgias  Review of Systems:    General:   No fever or chills; No significant weight loss or gain  EENT:   No ear pain, facial swelling; No sneezing, sore throat  Skin:   No rashes, color changes  +skin blisters b/l feet   Respiratory:     No shortness of breath, cough, wheezing, stridor  Cardiovascular:     No chest pain, palpitations  Gastrointestinal:    As per HPI  Musculoskeletal:     No arthralgias, myalgias, swelling  Neurologic:   No dizziness, numbness, weakness  No speech difficulties  Psych:   No agitation, suicidal ideations    Otherwise, All other twelve-point review of systems normal      Past Medical History:   Diagnosis Date   • Hepatitis C      History reviewed  No pertinent surgical history    Allergies   Allergen Reactions   • Benadryl [Diphenhydramine]      States it gives him restless legs and he breaks out in hives   • Penicillins Other (See Comments)     Pt reports acts strange       Medications:  Home Medications  Prior to Admission medications    Not on File       Inhouse Medications    Current Facility-Administered Medications:   •  acetaminophen (TYLENOL) tablet 650 mg, 650 mg, Oral, Q6H PRN, 650 mg at 10/09/22 0206  •  buprenorphine-naloxone (Suboxone) film 8 mg, 8 mg, Sublingual, BID, 8 mg at 10/09/22 2135  •  buprenorphine-naloxone (Suboxone) film 8 mg, 8 mg, Sublingual, Once  •  enoxaparin (LOVENOX) subcutaneous injection 40 mg, 40 mg, Subcutaneous, Daily  •  gabapentin (NEURONTIN) capsule 300 mg, 300 mg, Oral, Q8H PRN, 300 mg at 10/09/22 0044  •  neomycin-bacitracin-polymyxin b (NEOSPORIN) ointment 1 large application, 1 large application, Topical, BID, 1 large application at 49/91/89 1920  •  nicotine (NICODERM CQ) 21 mg/24 hr TD 24 hr patch 1 patch, 1 patch, Transdermal, Daily, 1 patch at 10/09/22 6285  •  ondansetron (ZOFRAN) injection 4 mg, 4 mg, Intravenous, Q4H PRN, 4 mg at 10/09/22 0044      Social History   reports that he has been smoking cigarettes  He has been smoking about 1 00 pack per day  He has never used smokeless tobacco  He reports previous alcohol use  He reports current drug use  Frequency: 7 00 times per week  Drug: Heroin  Family History  Family History   Problem Relation Age of Onset   • Alcohol abuse Father    • Cirrhosis Father          OBJECTIVE:    /89 (BP Location: Left arm)   Pulse 73   Temp 97 9 °F (36 6 °C) (Temporal)   Resp 16   Ht 6' (1 829 m)   Wt 74 8 kg (165 lb)   SpO2 97%   BMI 22 38 kg/m²   Physical Exam:     General Appearance:    Awake, alert, oriented x3, no distress, well developed, well    nourished   Head:    Normocephalic without obvious abnormality   Eyes:    PERRL, conjunctiva/corneas clear, EOM's intact, anicteric   Neck:   Supple, no adenopathy   Throat:   Mucous membranes moist   Lungs:     Clear to auscultation bilaterally, no wheezing or rhonchi   Heart:    Regular rate and rhythm, S1 and S2 normal, no murmur   Abdomen:     Soft, non-tender, non-distended  bowel sounds normoactive  No masses, rebound or guarding  No HSM   Extremities:   Extremities without edema   +healing blister b/l soles feet/toe   Psych  Derm:   Normal affect    No jaundice, palmar erythema, spider angiomata  +tattoos   Neurologic:   CNII-XII grossly intact  Speech intact         Laboratory Studies:  Results from last 7 days   Lab Units 10/08/22  0524 10/07/22  1447   WBC Thousand/uL 5 98 8 16   HEMOGLOBIN g/dL 13 7 13 2   HEMATOCRIT % 41 8 40 0   MCV fL 91 90   PLATELETS Thousands/uL 274 276     Results from last 7 days   Lab Units 10/08/22  0524 10/07/22  1447   SODIUM mmol/L 136 137   POTASSIUM mmol/L 4 0 4 0   CHLORIDE mmol/L 108 104   CO2 mmol/L 25 26   BUN mg/dL 16 20   CREATININE mg/dL 0 72 0 96   CALCIUM mg/dL 8 5 9 0   ALBUMIN g/dL  --  3 8   TOTAL BILIRUBIN mg/dL  --  0 83   ALK PHOS U/L  --  63   ALT U/L  --  38   AST U/L  --  59           Imaging and Other Studies:  No results found  ASSESSMENT AND PLAN:  1  Positive Hepatitis A IgM with normal liver enzymes  Typically self-limited infection requiring only supportive care  2  Chronic Hepatitis C, untreated  Normal liver enzymes  No liver imaging to review, suggest liver US while admitted  APRI score 0 5 suggesting minimal fibrosis  HCV viral load pending -outpatient follow up for treatment options if needed and able to maintain sobriety  Avoid hepatotoxins         Denia Ambrose PA-C

## 2022-10-10 NOTE — DISCHARGE SUMMARY
MEDICAL DETOX UNIT, LEVEL 4  Department of Medical Toxicology  Reason for Admission/Principal Problem: opioid withdrawal   Admitting provider: All Beckman  No att  providers found   10/7/2022  2:06 PM       Discharging Physician / Practitioner: All Beckman  PCP: No primary care provider on file  Admission Date:   Admission Orders (From admission, onward)     Ordered        10/07/22 [address]  Once                      Discharge Date: 10/10/22    Medical Problems             Resolved Problems  Date Reviewed: 10/7/2022          Resolved    * (Principal) Opioid withdrawal ([facility] [address] ) 10/9/2022     Resolved by  All Beckman PA-C                Opioid use disorder, moderate, dependence ([facility] [address] )  Assessment & Plan  Reports currently injects heroin/fentanyl, 3-4 bags daily x several months   Last use 10/7/2022 a few hrs prior to presentation (around noon-1 pm)  Has been to rehab in the past- currently interested in rehab upon discharge- patient is medically stable from a withdrawal perspective     · Continue maintenance dosing     Hepatitis  Assessment & Plan  · Hepatitis A:  High reactive IgM  · No current reported sx of diarrhea, fatigue, scleral icterus   · Hepatitis C: high reactive  · Patient endorses untreated history of hep C, viral load pending  · GI consulted- recommended additional labs and RUQ ultrasound  · Patient unwilling to stay in hospital due to family emergency, requested to leave with outpatient ultrasound and GI follow up     Friction blisters of the soles  Assessment & Plan  · Presents with multiple blisters of soles of feet  · Reports he is homeless and walks frequently; was recently in damp shoes   · Reports discomfort of feet bilaterally especially with ambulation  · On exam: multiple blisters seen on soles of feet; open blister noted on left 5th digit; no evidence of diffuse erythema, no discharge, no maceration    · Continue to monitor with supportive care  · Encourage good hygiene, keep feet clean and dry     Polysubstance abuse ([address] )  Assessment & Plan  · Reports he occasionally smokes marijuana and meth  · Denies use of cocaine, alcohol, benzodiazepines   · Most recent use a few days ago   · UDS + amph/meth, cocaine, THC  · Encourage cessation     Cigarette nicotine dependence  Assessment & Plan  Current every day smoker: 0 75-1 ppd   Encourage cessation  Offer nicotine replacement    IVDU (intravenous drug user)  Assessment & Plan  · Reports IVDU with fentanyl  · Injects primarily in b/l forearms; denies injecting in neck, lower extremities   · Track marks evident without acute signs of infection   · Admits to reusing needles, denies sharing needles  · Hepatitis A IgM- high reactive- patient reports risk factors of homelessness and IVDU  · GI consult   · Hepatitis C antibody- high reactive- patient endorses history of chronic hep C   · Encourage cessation         Consultations During Hospital Stay:  · GI    Procedures Performed:   · none    Significant Findings / Test Results:   · Hepatitis A IgM- high reactive   · Hepatitis C antibody- High reactive     Incidental Findings:   · None    Test Results Pending at Discharge (will require follow up): · None      Outpatient Tests Requested:  · None    Complications:  none    Reason for Admission: opioid withdrawal     Hospital Course:     Riccardo Hays is a 41 y o  male patient who originally presented to the hospital on 10/7/2022 due to acute opioid withdrawal  Patient has a past medical history of OUD, hepatitis C, history of IVDU  Patient reported to [facility] ED requesting medical detoxification from opioids  Upon admission transdermal buprenorphine patch was initiated after 24 hours patient was started on micro-induction  He received a total of 16 mg of suboxone during induction and transitioned to maintenance dosing of buprenorphine/ naloxone 8mg/2mg BID   Due to patient reported history of IVDU and homelessness hepatitis panel was initiated  Patient endorsed a history of hepatitis c, untreated  Hepatitis panel resulted and patient was found to be high reactive to Hepatitis A IgM and Hepatitis C antibody  GI was consulted recommended RUQ ultrasound and GI follow up  Patient requested immediate discharge on 10/10/22, he stated that his mother was passing away and he refused to wait for additional imaging  Case Management attempted to set up outpatient services, however patient walked off unit without discharge papers  Script for suboxone was sent to preferred pharmacy of choice  Please see above list of diagnoses and related plan for additional information  Condition at Discharge: fair     Discharge Day Visit / Exam:     Subjective:  Patient seen and examined this morning  Denies withdrawal symptoms  Patient was pacing around his room, demanding immediate discharge and all personal belongings  He continues to deny any headaches, chest pain, shortness of breath, or abdominal pain  Vitals: Blood Pressure: 144/89 (10/10/22 0730)  Pulse: 73 (10/10/22 0730)  Temperature: 97 9 °F (36 6 °C) (10/10/22 0730)  Temp Source: Temporal (10/10/22 0730)  Respirations: 16 (10/10/22 0730)  Height: 6' (182 9 cm) (10/07/22 1638)  Weight - Scale: 74 8 kg (165 lb) (10/07/22 1638)  SpO2: 97 % (10/10/22 0730)  Exam:   Physical Exam  Constitutional:       General: He is in acute distress  Cardiovascular:      Rate and Rhythm: Normal rate and regular rhythm  Heart sounds: No murmur heard  Pulmonary:      Effort: No respiratory distress  Breath sounds: Normal breath sounds  No wheezing  Abdominal:      Palpations: Abdomen is soft  Skin:     General: Skin is warm  Coloration: Skin is not jaundiced  Neurological:      Mental Status: He is alert and oriented to person, place, and time  Psychiatric:         Mood and Affect: Mood is anxious  Behavior: Behavior is agitated         Discussion with Family: Discussed with family     Discharge instructions/Information to patient and family:   See after visit summary for information provided to patient and family  Provisions for Follow-Up Care:  See after visit summary for information related to follow-up care and any pertinent home health orders  Disposition:     Home    For Discharges to [facility] SNF:   · Not Applicable to this Patient - Not Applicable to this Patient    Planned Readmission: none      Discharge Statement:  I spent 40 minutes discharging the patient  This time was spent on the day of discharge  I had direct contact with the patient on the day of discharge  Greater than 50% of the total time was spent examining patient, answering all patient questions, arranging and discussing plan of care with patient as well as directly providing post-discharge instructions  Additional time then spent on discharge activities  Discharge Medications:  See after visit summary for reconciled discharge medications provided to patient and family        ** Please Note: This note has been constructed using a voice recognition system **

## 2022-10-10 NOTE — ASSESSMENT & PLAN NOTE
· Hepatitis A:  High reactive IgM  · No current reported sx of diarrhea, fatigue, scleral icterus   · Hepatitis C: high reactive  · Patient endorses untreated history of hep C, viral load pending  · GI consulted- recommended additional labs and RUQ ultrasound  · Patient unwilling to stay in hospital due to family emergency, requested to leave with outpatient ultrasound and GI follow up

## 2022-10-10 NOTE — CASE MANAGEMENT
06-Dec-2020 Tavon contacted HOST to follow up with pt referral  Tavon informed referral information had not been received  Cm faxed referral information to HOST and HOST contacted pt to complete assessment  Tavon then received a return call from HOST indicating pt was requesting outpatient treatment at this time  Cm met with pt and pt indicated he was just informed that his mother is sick and he needs to leave ASAP  Pt states he will follow up with outpatient and HOST himself as needed

## 2022-10-11 LAB
HCV RNA SERPL NAA+PROBE-ACNC: NORMAL IU/ML
HCV RNA SERPL NAA+PROBE-LOG IU: 6.33 LOG10 IU/ML
TEST INFORMATION: NORMAL

## 2023-02-15 ENCOUNTER — HOSPITAL ENCOUNTER (EMERGENCY)
Facility: HOSPITAL | Age: 48
Discharge: HOME | End: 2023-02-15
Attending: EMERGENCY MEDICINE

## 2023-02-15 ENCOUNTER — APPOINTMENT (EMERGENCY)
Dept: CT IMAGING | Facility: HOSPITAL | Age: 48
End: 2023-02-15

## 2023-02-15 VITALS
DIASTOLIC BLOOD PRESSURE: 61 MMHG | WEIGHT: 214.95 LBS | SYSTOLIC BLOOD PRESSURE: 112 MMHG | HEART RATE: 85 BPM | RESPIRATION RATE: 12 BRPM | BODY MASS INDEX: 29.15 KG/M2 | TEMPERATURE: 98.4 F | OXYGEN SATURATION: 93 %

## 2023-02-15 DIAGNOSIS — N17.9 AKI (ACUTE KIDNEY INJURY) (HCC): ICD-10-CM

## 2023-02-15 DIAGNOSIS — T50.901A ACCIDENTAL DRUG OVERDOSE, INITIAL ENCOUNTER: ICD-10-CM

## 2023-02-15 DIAGNOSIS — T07.XXXA ABRASIONS OF MULTIPLE SITES: ICD-10-CM

## 2023-02-15 DIAGNOSIS — E87.29 ALCOHOLIC KETOACIDOSIS: Primary | ICD-10-CM

## 2023-02-15 DIAGNOSIS — N39.0 UTI (URINARY TRACT INFECTION): ICD-10-CM

## 2023-02-15 LAB
2HR DELTA HS TROPONIN: 1 NG/L
ALBUMIN SERPL BCP-MCNC: 2.8 G/DL (ref 3.5–5)
ALBUMIN SERPL BCP-MCNC: 4.6 G/DL (ref 3.5–5)
ALP SERPL-CCNC: 38 U/L (ref 43–122)
ALP SERPL-CCNC: 64 U/L (ref 43–122)
ALT SERPL W P-5'-P-CCNC: 207 U/L
ALT SERPL W P-5'-P-CCNC: 258 U/L
AMPHETAMINES SERPL QL SCN: POSITIVE
ANION GAP SERPL CALCULATED.3IONS-SCNC: 27 MMOL/L (ref 5–14)
ANION GAP SERPL CALCULATED.3IONS-SCNC: 3 MMOL/L (ref 5–14)
APAP SERPL-MCNC: <10 UG/ML (ref 10–20)
AST SERPL W P-5'-P-CCNC: 112 U/L (ref 17–59)
AST SERPL W P-5'-P-CCNC: 156 U/L (ref 17–59)
ATRIAL RATE: 139 BPM
BACTERIA UR QL AUTO: ABNORMAL /HPF
BARBITURATES UR QL: NEGATIVE
BASOPHILS # BLD AUTO: 0.04 THOUSANDS/ÂΜL (ref 0–0.1)
BASOPHILS NFR BLD AUTO: 0 % (ref 0–1)
BENZODIAZ UR QL: NEGATIVE
BILIRUB SERPL-MCNC: 0.32 MG/DL (ref 0.2–1)
BILIRUB SERPL-MCNC: 0.53 MG/DL (ref 0.2–1)
BILIRUB UR QL STRIP: NEGATIVE
BUN SERPL-MCNC: 7 MG/DL (ref 5–25)
BUN SERPL-MCNC: 8 MG/DL (ref 5–25)
CALCIUM ALBUM COR SERPL-MCNC: 8.3 MG/DL (ref 8.3–10.1)
CALCIUM SERPL-MCNC: 7.3 MG/DL (ref 8.4–10.2)
CALCIUM SERPL-MCNC: 9 MG/DL (ref 8.4–10.2)
CARDIAC TROPONIN I PNL SERPL HS: 4 NG/L
CARDIAC TROPONIN I PNL SERPL HS: 5 NG/L
CHLORIDE SERPL-SCNC: 101 MMOL/L (ref 96–108)
CHLORIDE SERPL-SCNC: 108 MMOL/L (ref 96–108)
CK MB SERPL-MCNC: 3.5 NG/ML (ref 0–2.4)
CK MB SERPL-MCNC: <1 % (ref 0–2.5)
CK SERPL-CCNC: 579 U/L (ref 55–170)
CLARITY UR: ABNORMAL
CO2 SERPL-SCNC: 13 MMOL/L (ref 21–32)
CO2 SERPL-SCNC: 26 MMOL/L (ref 21–32)
COCAINE UR QL: NEGATIVE
COLOR UR: ABNORMAL
CREAT SERPL-MCNC: 0.8 MG/DL (ref 0.7–1.5)
CREAT SERPL-MCNC: 1.33 MG/DL (ref 0.7–1.5)
EOSINOPHIL # BLD AUTO: 0.07 THOUSAND/ÂΜL (ref 0–0.61)
EOSINOPHIL NFR BLD AUTO: 1 % (ref 0–6)
ERYTHROCYTE [DISTWIDTH] IN BLOOD BY AUTOMATED COUNT: 13.3 % (ref 11.6–15.1)
ETHANOL SERPL-MCNC: <10 MG/DL (ref 0–10)
FINE GRAN CASTS URNS QL MICRO: ABNORMAL /LPF
GFR SERPL CREATININE-BSD FRML MDRD: 107 ML/MIN/1.73SQ M
GFR SERPL CREATININE-BSD FRML MDRD: 63 ML/MIN/1.73SQ M
GLUCOSE SERPL-MCNC: 139 MG/DL (ref 70–99)
GLUCOSE SERPL-MCNC: 156 MG/DL (ref 65–140)
GLUCOSE SERPL-MCNC: 165 MG/DL (ref 70–99)
GLUCOSE UR STRIP-MCNC: NEGATIVE MG/DL
HCT VFR BLD AUTO: 50.9 % (ref 36.5–49.3)
HGB BLD-MCNC: 16 G/DL (ref 12–17)
HGB UR QL STRIP.AUTO: 25
HYALINE CASTS #/AREA URNS LPF: ABNORMAL /LPF
IMM GRANULOCYTES # BLD AUTO: 0.06 THOUSAND/UL (ref 0–0.2)
IMM GRANULOCYTES NFR BLD AUTO: 1 % (ref 0–2)
KETONES UR STRIP-MCNC: ABNORMAL MG/DL
LEUKOCYTE ESTERASE UR QL STRIP: 100
LYMPHOCYTES # BLD AUTO: 4.03 THOUSANDS/ÂΜL (ref 0.6–4.47)
LYMPHOCYTES NFR BLD AUTO: 38 % (ref 14–44)
MCH RBC QN AUTO: 30 PG (ref 26.8–34.3)
MCHC RBC AUTO-ENTMCNC: 31.4 G/DL (ref 31.4–37.4)
MCV RBC AUTO: 96 FL (ref 82–98)
METHADONE UR QL: NEGATIVE
MONOCYTES # BLD AUTO: 2.21 THOUSAND/ÂΜL (ref 0.17–1.22)
MONOCYTES NFR BLD AUTO: 21 % (ref 4–12)
NEUTROPHILS # BLD AUTO: 4.32 THOUSANDS/ÂΜL (ref 1.85–7.62)
NEUTS SEG NFR BLD AUTO: 39 % (ref 43–75)
NITRITE UR QL STRIP: POSITIVE
NON-SQ EPI CELLS URNS QL MICRO: ABNORMAL /HPF
NRBC BLD AUTO-RTO: 0 /100 WBCS
OPIATES UR QL SCN: NEGATIVE
OXYCODONE+OXYMORPHONE UR QL SCN: NEGATIVE
P AXIS: 39 DEGREES
PCP UR QL: NEGATIVE
PH UR STRIP.AUTO: 6 [PH]
PLATELET # BLD AUTO: 305 THOUSANDS/UL (ref 149–390)
PMV BLD AUTO: 10.2 FL (ref 8.9–12.7)
POTASSIUM SERPL-SCNC: 3 MMOL/L (ref 3.5–5.3)
POTASSIUM SERPL-SCNC: 3.3 MMOL/L (ref 3.5–5.3)
PR INTERVAL: 124 MS
PROT SERPL-MCNC: 5.3 G/DL (ref 6.4–8.4)
PROT SERPL-MCNC: 7.7 G/DL (ref 6.4–8.4)
PROT UR STRIP-MCNC: ABNORMAL MG/DL
QRS AXIS: 74 DEGREES
QRSD INTERVAL: 96 MS
QT INTERVAL: 366 MS
QTC INTERVAL: 556 MS
RBC # BLD AUTO: 5.33 MILLION/UL (ref 3.88–5.62)
RBC #/AREA URNS AUTO: ABNORMAL /HPF
SALICYLATES SERPL-MCNC: <1 MG/DL (ref 10–30)
SODIUM SERPL-SCNC: 137 MMOL/L (ref 135–147)
SODIUM SERPL-SCNC: 141 MMOL/L (ref 135–147)
SP GR UR STRIP.AUTO: 1.02 (ref 1–1.04)
T WAVE AXIS: 46 DEGREES
THC UR QL: NEGATIVE
UROBILINOGEN UA: 1 MG/DL
VENTRICULAR RATE: 139 BPM
WBC # BLD AUTO: 10.73 THOUSAND/UL (ref 4.31–10.16)
WBC #/AREA URNS AUTO: ABNORMAL /HPF

## 2023-02-15 RX ORDER — POTASSIUM CHLORIDE 14.9 MG/ML
20 INJECTION INTRAVENOUS
Status: COMPLETED | OUTPATIENT
Start: 2023-02-15 | End: 2023-02-15

## 2023-02-15 RX ORDER — HALOPERIDOL 5 MG/ML
INJECTION INTRAMUSCULAR
Status: COMPLETED
Start: 2023-02-15 | End: 2023-02-15

## 2023-02-15 RX ORDER — CEPHALEXIN 500 MG/1
500 CAPSULE ORAL ONCE
Status: COMPLETED | OUTPATIENT
Start: 2023-02-15 | End: 2023-02-15

## 2023-02-15 RX ORDER — HALOPERIDOL 5 MG/ML
10 INJECTION INTRAMUSCULAR ONCE
Status: COMPLETED | OUTPATIENT
Start: 2023-02-15 | End: 2023-02-15

## 2023-02-15 RX ORDER — CEPHALEXIN 500 MG/1
500 CAPSULE ORAL EVERY 6 HOURS SCHEDULED
Qty: 20 CAPSULE | Refills: 0 | Status: SHIPPED | OUTPATIENT
Start: 2023-02-15 | End: 2023-02-20

## 2023-02-15 RX ORDER — LORAZEPAM 2 MG/ML
2 INJECTION INTRAMUSCULAR ONCE
Status: COMPLETED | OUTPATIENT
Start: 2023-02-15 | End: 2023-02-15

## 2023-02-15 RX ORDER — LORAZEPAM 2 MG/ML
2 INJECTION INTRAMUSCULAR ONCE
Status: DISCONTINUED | OUTPATIENT
Start: 2023-02-15 | End: 2023-02-16 | Stop reason: HOSPADM

## 2023-02-15 RX ADMIN — HALOPERIDOL 10 MG: 5 INJECTION INTRAMUSCULAR at 14:29

## 2023-02-15 RX ADMIN — CEPHALEXIN 500 MG: 500 CAPSULE ORAL at 23:02

## 2023-02-15 RX ADMIN — TETANUS TOXOID, REDUCED DIPHTHERIA TOXOID AND ACELLULAR PERTUSSIS VACCINE, ADSORBED 0.5 ML: 5; 2.5; 8; 8; 2.5 SUSPENSION INTRAMUSCULAR at 13:37

## 2023-02-15 RX ADMIN — LORAZEPAM 2 MG: 2 INJECTION INTRAMUSCULAR; INTRAVENOUS at 12:55

## 2023-02-15 RX ADMIN — HALOPERIDOL LACTATE 10 MG: 5 INJECTION, SOLUTION INTRAMUSCULAR at 14:29

## 2023-02-15 RX ADMIN — POTASSIUM CHLORIDE 20 MEQ: 14.9 INJECTION, SOLUTION INTRAVENOUS at 16:48

## 2023-02-15 RX ADMIN — DEXTROSE AND SODIUM CHLORIDE 1000 ML: 5; .9 INJECTION, SOLUTION INTRAVENOUS at 16:52

## 2023-02-15 RX ADMIN — POTASSIUM CHLORIDE 20 MEQ: 14.9 INJECTION, SOLUTION INTRAVENOUS at 14:33

## 2023-02-15 RX ADMIN — SODIUM CHLORIDE 1000 ML: 0.9 INJECTION, SOLUTION INTRAVENOUS at 13:14

## 2023-02-15 RX ADMIN — SODIUM CHLORIDE 1000 ML: 0.9 INJECTION, SOLUTION INTRAVENOUS at 14:08

## 2023-02-15 RX ADMIN — THIAMINE HYDROCHLORIDE 100 MG: 100 INJECTION, SOLUTION INTRAMUSCULAR; INTRAVENOUS at 19:37

## 2023-02-15 RX ADMIN — THIAMINE HYDROCHLORIDE 400 MG: 100 INJECTION, SOLUTION INTRAMUSCULAR; INTRAVENOUS at 21:23

## 2023-02-15 NOTE — ED PROVIDER NOTES
History  Chief Complaint   Patient presents with   • Overdose - Accidental     Pt brought in by EMS for an overdose of an unknown substance  "I drank some alcohol this morning, not even a full beer " States he did not use drugs, did smoke a new type of cigarette from a coworker  56 y/o M presenting via police and EMS for eval after being found 'fighting police' patient has a known history of opiate use however adamantly denies recreational substance use  Patient is oriented to place and year however not event as he cannot tell us whether he used recreational substances nor how he received abrasions to his face  Prior to Admission Medications   Prescriptions Last Dose Informant Patient Reported? Taking? Buprenorphine HCl-Naloxone HCl 11 4-2 9 MG SUBL   No No   Sig: Place 1 tablet under the tongue daily      Facility-Administered Medications: None       Past Medical History:   Diagnosis Date   • Hepatitis C        History reviewed  No pertinent surgical history  Family History   Problem Relation Age of Onset   • Alcohol abuse Father    • Cirrhosis Father      I have reviewed and agree with the history as documented  E-Cigarette/Vaping     E-Cigarette/Vaping Substances     Social History     Tobacco Use   • Smoking status: Every Day     Packs/day: 1 00     Types: Cigarettes   • Smokeless tobacco: Never   Substance Use Topics   • Alcohol use: Not Currently     Comment: social   • Drug use: Yes     Frequency: 7 0 times per week     Types: Heroin     Comment: about 2 bags       Review of Systems   Unable to perform ROS: Mental status change       Physical Exam  Physical Exam  Vitals and nursing note reviewed  Constitutional:       Appearance: Normal appearance  He is well-developed  HENT:      Head: Normocephalic  Abrasion present  No raccoon eyes or Adkins's sign  Comments: No depressions, crepitus, lacerations  Exam is negative for hemotympanum and septal hematoma    Eyes:      General: No scleral icterus  Pupils: Pupils are equal, round, and reactive to light  Comments: Pupils are 4mm b/l, no nystagmus appreciated  Cardiovascular:      Rate and Rhythm: Normal rate and regular rhythm  Pulses: Normal pulses  Pulmonary:      Effort: Pulmonary effort is normal  No respiratory distress  Breath sounds: No stridor  Abdominal:      General: There is no distension  Palpations: There is no mass  Musculoskeletal:      Cervical back: Normal range of motion  Skin:     General: Skin is warm and moist       Capillary Refill: Capillary refill takes less than 2 seconds  Coloration: Skin is not jaundiced  Findings: Abrasion present  Comments: diaphoretic   Neurological:      GCS: GCS eye subscore is 3  GCS verbal subscore is 4  GCS motor subscore is 6  Gait: Gait normal       Comments: Upon initial arrival patient was erratic in movements and agitated, 2mg IM ativan administered with significnat improvement in agitation, able to facilitate workup after ativan  1318: Patient is sleepy however easily arousable with verbal stimuli  Is redirectable      Psychiatric:         Mood and Affect: Mood normal          Vital Signs  ED Triage Vitals   Temperature Pulse Respirations Blood Pressure SpO2   02/15/23 1321 02/15/23 1314 02/15/23 1314 02/15/23 1314 02/15/23 1314   98 4 °F (36 9 °C) (!) 136 19 139/71 93 %      Temp Source Heart Rate Source Patient Position - Orthostatic VS BP Location FiO2 (%)   02/15/23 1321 02/15/23 1314 02/15/23 1314 02/15/23 1314 --   Tympanic Monitor Sitting Right arm       Pain Score       --                  Vitals:    02/15/23 1405 02/15/23 1605 02/15/23 1749 02/15/23 2055   BP:   109/68 112/61   Pulse: (!) 116 100 94 85   Patient Position - Orthostatic VS:   Lying Lying         Visual Acuity      ED Medications  Medications   dextrose 5% lactated Ringer's bolus 1,000 mL ( Intravenous Canceled Entry 2/15/23 1652)   LORazepam (ATIVAN) injection 2 mg (2 mg Intramuscular Not Given 2/15/23 1918)   sodium chloride 0 9 % bolus 1,000 mL (0 mL Intravenous Stopped 2/15/23 1408)   tetanus-diphtheria-acellular pertussis (BOOSTRIX) IM injection 0 5 mL (0 5 mL Intramuscular Given 2/15/23 1337)   LORazepam (ATIVAN) injection 2 mg (2 mg Intramuscular Given 2/15/23 1255)   sodium chloride 0 9 % bolus 1,000 mL (0 mL Intravenous Stopped 2/15/23 1637)   potassium chloride 20 mEq IVPB (premix) (0 mEq Intravenous Stopped 2/15/23 1933)   haloperidol lactate (HALDOL) injection 10 mg (10 mg Intramuscular Given 2/15/23 1429)   thiamine (VITAMIN B1) 100 mg in sodium chloride 0 9 % 50 mL IVPB (0 mg Intravenous Stopped 2/15/23 2055)   dextrose 5 % and sodium chloride 0 9 % bolus 1,000 mL (0 mL Intravenous Stopped 2/15/23 1752)   thiamine (VITAMIN B1) 400 mg in sodium chloride 0 9 % 50 mL IVPB (400 mg Intravenous New Bag 2/15/23 2123)       Diagnostic Studies  Results Reviewed     Procedure Component Value Units Date/Time    HS Troponin I 2hr [[id number]]  (Normal) Collected: 02/15/23 1809    Lab Status: Final result Specimen: Blood from Line, Venous Updated: 02/15/23 1839     hs TnI 2hr 5 ng/L      Delta 2hr hsTnI 1 ng/L     Comprehensive metabolic panel [[id number]]  (Abnormal) Collected: 02/15/23 1809    Lab Status: Final result Specimen: Blood from Line, Venous Updated: 02/15/23 1838     Sodium 137 mmol/L      Potassium 3 3 mmol/L      Chloride 108 mmol/L      CO2 26 mmol/L      ANION GAP 3 mmol/L      BUN 7 mg/dL      Creatinine 0 80 mg/dL      Glucose 165 mg/dL      Calcium 7 3 mg/dL      Corrected Calcium 8 3 mg/dL       U/L       U/L      Alkaline Phosphatase 38 U/L      Total Protein 5 3 g/dL      Albumin 2 8 g/dL      Total Bilirubin 0 32 mg/dL      eGFR 107 ml/min/1 73sq m     Narrative:      [facility] guidelines for Chronic Kidney Disease (CKD):   •  Stage 1 with normal or high GFR (GFR > 90 mL/min/1 73 square meters)  • Stage 2 Mild CKD (GFR = 60-89 mL/min/1 73 square meters)  •  Stage 3A Moderate CKD (GFR = 45-59 mL/min/1 73 square meters)  •  Stage 3B Moderate CKD (GFR = 30-44 mL/min/1 73 square meters)  •  Stage 4 Severe CKD (GFR = 15-29 mL/min/1 73 square meters)  •  Stage 5 End Stage CKD (GFR <15 mL/min/1 73 square meters)  Note: GFR calculation is accurate only with a steady state creatinine    CKMB [[id number]]  (Abnormal) Collected: 02/15/23 1324    Lab Status: Final result Specimen: Blood from Arm, Left Updated: 02/15/23 1416     CK-MB Index <1 0 %      CK-MB 3 5 ng/mL     Comprehensive metabolic panel [[id number]]  (Abnormal) Collected: 02/15/23 1324    Lab Status: Final result Specimen: Blood from Arm, Left Updated: 02/15/23 1412     Sodium 141 mmol/L      Potassium 3 0 mmol/L      Chloride 101 mmol/L      CO2 13 mmol/L      ANION GAP 27 mmol/L      BUN 8 mg/dL      Creatinine 1 33 mg/dL      Glucose 139 mg/dL      Calcium 9 0 mg/dL       U/L       U/L      Alkaline Phosphatase 64 U/L      Total Protein 7 7 g/dL      Albumin 4 6 g/dL      Total Bilirubin 0 53 mg/dL      eGFR 63 ml/min/1 73sq m     Narrative:      [facility] guidelines for Chronic Kidney Disease (CKD):   •  Stage 1 with normal or high GFR (GFR > 90 mL/min/1 73 square meters)  •  Stage 2 Mild CKD (GFR = 60-89 mL/min/1 73 square meters)  •  Stage 3A Moderate CKD (GFR = 45-59 mL/min/1 73 square meters)  •  Stage 3B Moderate CKD (GFR = 30-44 mL/min/1 73 square meters)  •  Stage 4 Severe CKD (GFR = 15-29 mL/min/1 73 square meters)  •  Stage 5 End Stage CKD (GFR <15 mL/min/1 73 square meters)  Note: GFR calculation is accurate only with a steady state creatinine    HS Troponin 0hr (reflex protocol) [[id number]]  (Normal) Collected: 02/15/23 1324    Lab Status: Final result Specimen: Blood from Arm, Left Updated: 02/15/23 1407     hs TnI 0hr 4 ng/L     Ethanol [[id number]]  (Normal) Collected: 02/15/23 1324    Lab Status: Final result Specimen: Blood from Arm, Left Updated: 02/15/23 1400     Ethanol Lvl <34 mg/dL     Salicylate level [[id number]]  (Abnormal) Collected: 02/15/23 1324    Lab Status: Final result Specimen: Blood from Arm, Left Updated: 28/81/54 2057     Salicylate Lvl <6 7 mg/dL     Acetaminophen level-If concentration is detectable, please discuss with medical  on call   [[id number]]  (Abnormal) Collected: 02/15/23 1324    Lab Status: Final result Specimen: Blood from Arm, Left Updated: 02/15/23 1400     Acetaminophen Level <10 ug/mL     CK Total with Reflex CKMB [[id number]]  (Abnormal) Collected: 02/15/23 1324    Lab Status: Final result Specimen: Blood from Arm, Left Updated: 02/15/23 1359     Total  U/L     CBC and differential [[id number]]  (Abnormal) Collected: 02/15/23 1324    Lab Status: Final result Specimen: Blood from Arm, Left Updated: 02/15/23 1341     WBC 10 73 Thousand/uL      RBC 5 33 Million/uL      Hemoglobin 16 0 g/dL      Hematocrit 50 9 %      MCV 96 fL      MCH 30 0 pg      MCHC 31 4 g/dL      RDW 13 3 %      MPV 10 2 fL      Platelets 425 Thousands/uL      nRBC 0 /100 WBCs      Neutrophils Relative 39 %      Immat GRANS % 1 %      Lymphocytes Relative 38 %      Monocytes Relative 21 %      Eosinophils Relative 1 %      Basophils Relative 0 %      Neutrophils Absolute 4 32 Thousands/µL      Immature Grans Absolute 0 06 Thousand/uL      Lymphocytes Absolute 4 03 Thousands/µL      Monocytes Absolute 2 21 Thousand/µL      Eosinophils Absolute 0 07 Thousand/µL      Basophils Absolute 0 04 Thousands/µL     Fingerstick Glucose (POCT) [[id number]]  (Abnormal) Collected: 02/15/23 1310    Lab Status: Final result Updated: 02/15/23 1312     POC Glucose 156 mg/dl     Rapid drug screen, urine [[id number]]     Lab Status: No result Specimen: Urine     UA (URINE) with reflex to Scope [[id number]]     Lab Status: No result Specimen: Urine                  CT facial bones wo contrast   Final Result by Gladis Blakely Danielle Leonard MD (02/15 1920)      No evidence of acute traumatic injury to the facial bones  Poor dentition  Dental follow-up advised  Workstation performed: [id number]         CT head wo contrast   Final Result by Martínez Regalado MD (02/15 1918)      No intracranial hemorrhage or calvarial fracture  Workstation performed: [id number]         CT spine cervical wo contrast   Final Result by Martínez Regalado MD (02/15 1922)         Mild spondylosis  Workstation performed: [id number]                    Procedures  ECG 12 Lead Documentation Only    Date/Time: 2/15/2023 1:27 PM  Performed by: Jac Mercado PA-C  Authorized by: Jac Mercado PA-C     Indications / Diagnosis:  Reported overdose  ECG reviewed by me, the ED Provider: yes    Patient location:  ED  Previous ECG:     Comparison to cardiac monitor: Yes    Interpretation:     Interpretation: normal    Rate:     ECG rate:  139    ECG rate assessment: tachycardic    Rhythm:     Rhythm: sinus tachycardia    Ectopy:     Ectopy: none    QRS:     QRS axis:  Normal    QRS intervals:  Normal  Conduction:     Conduction: abnormal      Abnormal conduction: complete RBBB    ST segments:     ST segments:  Normal  T waves:     T waves: normal    Other findings:     Other findings: prolonged qTc interval    Comments:        QRS 96                 ED Course  ED Course as of 02/15/23 2225   Wed Feb 15, 2023   1430 Patient with erratic behavior and movements consistent with choreoakithesia  Haldol given due to suspicion for dopamine mediated process  Improvement in agitation / erratic emovements  [facility] called down to recommend discussion for potential alcohol w/drawal and admission for the same  Patient is currently responsive however sleepy and unable to answer whether he wants admission   Will administer thiamine   1543 Dr Serrano concerned for alcoholic ketoacidosis, [id number] ordered for treatment  Will recheck CMP after [id number] and ensure improvement in kidney function and labs  1545 Anion Gap(!): 27   1545 Creatinine: 1 33   1842 Creatinine: 0 80   1843 Anion Gap(!): 3  Patient with improvement in acidosis and ONOFRE  Awaiting CT results, once returned will discuss rehabilitation / detox admission vs discharge  1935 Patient is resting comfortably and was informed of CT scan results patient admits to drinking alcohol today and reports he is taking "street Suboxone" patient ports he is unsure if there is any potential for this "street Suboxone" to be laced with any other substances  Patient adamantly refuses rehabilitation and detox offers and states he is not suicidal nor homicidal   Patient falls asleep very easily and will be allowed to metabolize until he is able to ambulate without difficulty  Medical Decision Making  56 y/o M known hx of opioid use, presents via police and ems [place]fighting[place]  Patient is agitated in both responses and physical movements upon initial arrival, 2mg IM ativan administered to facilitate workup and reduce agitation - this was successful and workup was easily obtained including basic labs, CKMB to evaluate for rhabdo, EKG, Troponin due to potential cocaine use, fluids, and CT scan due to abrasions and concern for head injury to be cause for patient's symptoms as he does deny drug use  High suspicion for both polysubstance use vs sympathomimetic or alcohol (due to tachycardia, sweating, and agitation) or sympathimometic and opioid combined as in a 'speed ball' (due to patient's respiratory depression s/p ativan requiring 3lpm NC for a period of time to maintain o2 above 95%  )     Patient workup reveals concerning for alcoholic ketoacidosis  500 mg of thiamine and D5 normal saline administered with positive improvement in anion gap and CO2    Additionally patient with increased creatinine which was also resolved after fluids  Patient allowed to metabolized any substances in his system and after patient adamantly refused admission to detox unit  And was signed out to colleague pending metabolization of recreational substances and ability to ambulate with steady gait  Amount and/or Complexity of Data Reviewed  Labs: ordered  Decision-making details documented in ED Course  Radiology: ordered  Risk  Prescription drug management  Disposition  Final diagnoses:   Alcoholic ketoacidosis   Accidental drug overdose, initial encounter   ONOFRE (acute kidney injury) ([facility] )   Abrasions of multiple sites     Time reflects when diagnosis was documented in both MDM as applicable and the Disposition within this note     Time User Action Codes Description Comment    2/15/2023 10:25 PM Ivis Gourd Add [O15 39] Alcoholic ketoacidosis     2/15/2023 10:25 PM Jorge Luis Alcaraz [facility] Accidental drug overdose, initial encounter     2/15/2023 10:25 PM Ivis Gourd Add [N17 9] ONOFRE (acute kidney injury) ([facility] )     2/15/2023 10:25 PM Ivis Gourd Add [T07  XXXA] Abrasions of multiple sites       ED Disposition     None      Follow-up Information    None         Patient's Medications   Discharge Prescriptions    No medications on file       No discharge procedures on file      PDMP Review     None          ED Provider  Electronically Signed by           Ariana Payton PA-C  02/15/23 5656

## 2023-02-16 NOTE — ED CARE HANDOFF
Emergency Department Sign Out Note        Sign out and transfer of care from [place], [place]  See Separate Emergency Department note  The patient, Fatimah Woods, was evaluated by the previous provider for after a suspected drug overdose  Prior to arrival the patient had been "fighting police" and when he arrived he had multiple abrasions on his face  He did not recall what happened and only admitted to drinking a small amount of alcohol  He denied using any drugs but stated he smoked a cigarette given to him by a coworker  Workup Completed:  Results Reviewed     Procedure Component Value Units Date/Time    Rapid drug screen, urine [[id number]]  (Abnormal) Collected: 02/15/23 2227    Lab Status: Final result Specimen: Urine, Other Updated: 02/15/23 2317     Amph/Meth UR Positive     Barbiturate Ur Negative     Benzodiazepine Urine Negative     Cocaine Urine Negative     Methadone Urine Negative     Opiate Urine Negative     PCP Ur Negative     THC Urine Negative     Oxycodone Urine Negative    Narrative:      FOR MEDICAL PURPOSES ONLY  IF CONFIRMATION NEEDED PLEASE CONTACT THE LAB WITHIN 5 DAYS      Drug Screen Cutoff Levels:  AMPHETAMINE/METHAMPHETAMINES  1000 ng/mL  BARBITURATES     200 ng/mL  BENZODIAZEPINES     200 ng/mL  COCAINE      300 ng/mL  METHADONE      300 ng/mL  OPIATES      300 ng/mL  PHENCYCLIDINE     25 ng/mL  THC       50 ng/mL  OXYCODONE      100 ng/mL    Urine Microscopic [[id number]]  (Abnormal) Collected: 02/15/23 2227    Lab Status: Final result Specimen: Urine, Other Updated: 02/15/23 2258     RBC, UA 1-2 /hpf      WBC, UA 10-20 /hpf      Epithelial Cells Occasional /hpf      Bacteria, UA Innumerable /hpf      Hyaline Casts, UA 1-2 /lpf      Fine granular casts 0-1 /lpf     UA (URINE) with reflex to Scope [[id number]]  (Abnormal) Collected: 02/15/23 2227    Lab Status: Final result Specimen: Urine, Other Updated: 02/15/23 2247     Color, UA [place]     Clarity, UA Slightly Cloudy     Specific Gravity, UA 1 025     pH, UA 6 0     Leukocytes,  0     Nitrite, UA Positive     Protein,  (2+) mg/dl      Glucose, UA Negative mg/dl      Ketones, UA 50 (2+) mg/dl      Bilirubin, UA Negative     Occult Blood, UA 25 0     UROBILINOGEN UA 1 0 mg/dL     HS Troponin I 2hr [[id number]]  (Normal) Collected: 02/15/23 1809    Lab Status: Final result Specimen: Blood from Line, Venous Updated: 02/15/23 1839     hs TnI 2hr 5 ng/L      Delta 2hr hsTnI 1 ng/L     Comprehensive metabolic panel [[id number]]  (Abnormal) Collected: 02/15/23 1809    Lab Status: Final result Specimen: Blood from Line, Venous Updated: 02/15/23 1838     Sodium 137 mmol/L      Potassium 3 3 mmol/L      Chloride 108 mmol/L      CO2 26 mmol/L      ANION GAP 3 mmol/L      BUN 7 mg/dL      Creatinine 0 80 mg/dL      Glucose 165 mg/dL      Calcium 7 3 mg/dL      Corrected Calcium 8 3 mg/dL       U/L       U/L      Alkaline Phosphatase 38 U/L      Total Protein 5 3 g/dL      Albumin 2 8 g/dL      Total Bilirubin 0 32 mg/dL      eGFR 107 ml/min/1 73sq m     Narrative:      [place] guidelines for Chronic Kidney Disease (CKD):   •  Stage 1 with normal or high GFR (GFR > 90 mL/min/1 73 square meters)  •  Stage 2 Mild CKD (GFR = 60-89 mL/min/1 73 square meters)  •  Stage 3A Moderate CKD (GFR = 45-59 mL/min/1 73 square meters)  •  Stage 3B Moderate CKD (GFR = 30-44 mL/min/1 73 square meters)  •  Stage 4 Severe CKD (GFR = 15-29 mL/min/1 73 square meters)  •  Stage 5 End Stage CKD (GFR <15 mL/min/1 73 square meters)  Note: GFR calculation is accurate only with a steady state creatinine    CKMB [[id number]]  (Abnormal) Collected: 02/15/23 1324    Lab Status: Final result Specimen: Blood from Arm, Left Updated: 02/15/23 1416     CK-MB Index <1 0 %      CK-MB 3 5 ng/mL     Comprehensive metabolic panel [[id number]]  (Abnormal) Collected: 02/15/23 1324    Lab Status: Final result Specimen: Blood from Arm, Left Updated: 02/15/23 1412     Sodium 141 mmol/L      Potassium 3 0 mmol/L      Chloride 101 mmol/L      CO2 13 mmol/L      ANION GAP 27 mmol/L      BUN 8 mg/dL      Creatinine 1 33 mg/dL      Glucose 139 mg/dL      Calcium 9 0 mg/dL       U/L       U/L      Alkaline Phosphatase 64 U/L      Total Protein 7 7 g/dL      Albumin 4 6 g/dL      Total Bilirubin 0 53 mg/dL      eGFR 63 ml/min/1 73sq m     Narrative:      [place] guidelines for Chronic Kidney Disease (CKD):   •  Stage 1 with normal or high GFR (GFR > 90 mL/min/1 73 square meters)  •  Stage 2 Mild CKD (GFR = 60-89 mL/min/1 73 square meters)  •  Stage 3A Moderate CKD (GFR = 45-59 mL/min/1 73 square meters)  •  Stage 3B Moderate CKD (GFR = 30-44 mL/min/1 73 square meters)  •  Stage 4 Severe CKD (GFR = 15-29 mL/min/1 73 square meters)  •  Stage 5 End Stage CKD (GFR <15 mL/min/1 73 square meters)  Note: GFR calculation is accurate only with a steady state creatinine    HS Troponin 0hr (reflex protocol) [[id number]]  (Normal) Collected: 02/15/23 1324    Lab Status: Final result Specimen: Blood from Arm, Left Updated: 02/15/23 1407     hs TnI 0hr 4 ng/L     Ethanol [[id number]]  (Normal) Collected: 02/15/23 1324    Lab Status: Final result Specimen: Blood from Arm, Left Updated: 02/15/23 1400     Ethanol Lvl <28 mg/dL     Salicylate level [[id number]]  (Abnormal) Collected: 02/15/23 1324    Lab Status: Final result Specimen: Blood from Arm, Left Updated: 29/77/47 2908     Salicylate Lvl <9 3 mg/dL     Acetaminophen level-If concentration is detectable, please discuss with medical  on call   [[id number]]  (Abnormal) Collected: 02/15/23 1324    Lab Status: Final result Specimen: Blood from Arm, Left Updated: 02/15/23 1400     Acetaminophen Level <10 ug/mL     CK Total with Reflex CKMB [[id number]]  (Abnormal) Collected: 02/15/23 1324    Lab Status: Final result Specimen: Blood from Arm, Left Updated: 02/15/23 7855 Total  U/L     CBC and differential [[id number]]  (Abnormal) Collected: 02/15/23 1324    Lab Status: Final result Specimen: Blood from Arm, Left Updated: 02/15/23 1341     WBC 10 73 Thousand/uL      RBC 5 33 Million/uL      Hemoglobin 16 0 g/dL      Hematocrit 50 9 %      MCV 96 fL      MCH 30 0 pg      MCHC 31 4 g/dL      RDW 13 3 %      MPV 10 2 fL      Platelets 893 Thousands/uL      nRBC 0 /100 WBCs      Neutrophils Relative 39 %      Immat GRANS % 1 %      Lymphocytes Relative 38 %      Monocytes Relative 21 %      Eosinophils Relative 1 %      Basophils Relative 0 %      Neutrophils Absolute 4 32 Thousands/µL      Immature Grans Absolute 0 06 Thousand/uL      Lymphocytes Absolute 4 03 Thousands/µL      Monocytes Absolute 2 21 Thousand/µL      Eosinophils Absolute 0 07 Thousand/µL      Basophils Absolute 0 04 Thousands/µL     Fingerstick Glucose (POCT) [[id number]]  (Abnormal) Collected: 02/15/23 1310    Lab Status: Final result Updated: 02/15/23 1312     POC Glucose 156 mg/dl           CT facial bones wo contrast   Final Result by Rex Brower MD (02/15 1920)      No evidence of acute traumatic injury to the facial bones  Poor dentition  Dental follow-up advised  Workstation performed: [id number]         CT head wo contrast   Final Result by Rex Brower MD (02/15 1918)      No intracranial hemorrhage or calvarial fracture  Workstation performed: [id number]         CT spine cervical wo contrast   Final Result by Rex Brower MD (02/15 1922)         Mild spondylosis  Workstation performed: [id number]               ED Course / Workup Pending (followup):    ED Course as of 02/16/23 0816   Wed Feb 15, 2023   2230 Sign out taken from MAIN [place], [place]  Patient initially brought in via EMS due to an accidental overdose  At this time work up is complete and patient is able to leave once clinically sober  2250 Patient A&O x3  Ambulatory in the hallway with a steady gait  Patient was able to fully dress himself without assistance  At this time patient is clinically sober and is stable for discharge  2253 Leukocytes, UA(!): 100 0   2253 Nitrite, UA(!): Positive   2300 WBC, UA(!): 10-20   2300 Bacteria, UA(!): Innumerable   2300 Urinalysis consistent with a UTI  Will give patient first dose of antibiotics prior to discharge  Procedures   None      Medical Decision Making  Patient presented via EMS for a suspected drug overdose  He denied drug use and only reported drinking a small amount of alcohol this morning  He had multiple abrasions on his face which he could not explain  The patient was very erratic and after being given IM Ativan, he underwent a work-up including CBC, CMP, total CK, CK-MB, troponin, coma panel, and CTs of the head, C-spine, and facial bones  Imaging was all negative for acute abnormalities  Labs were concerning for alcohol ketoacidosis and after a toxicology consult the patient was given 500 mg of thiamine and D5 normal saline  Patient offered detox, however he was not interested it was decided he could be discharged once clinically sober  Patient was signed out to me pending urinalysis, rapid drug screen, and clinical sobriety  UDS was positive for amphetamines and urinalysis showed 10-20 WBC/hpf with innumerable bacteria, which was nitrite positive per the UA  Patient noted to be more alert and was oriented to person, place, and time  He dressed himself and ambulated down the hallway without assistance  He was deemed clinically sober and stable for discharge at that time  Patient given first dose of Keflex for his UTI and a prescription was sent to his preferred pharmacy  Return precautions discussed and he verbalized understanding      Abrasions of multiple sites: acute illness or injury  Accidental drug overdose, initial encounter: acute illness or injury  ONOFRE (acute kidney injury) ([address] ): acute illness or injury  Alcoholic ketoacidosis: acute illness or injury  UTI (urinary tract infection): acute illness or injury  Amount and/or Complexity of Data Reviewed  Labs: ordered  Decision-making details documented in ED Course  Radiology: ordered  Risk  Prescription drug management  Disposition  Final diagnoses:   Alcoholic ketoacidosis   Accidental drug overdose, initial encounter   [facility]   Abrasions of multiple sites   UTI (urinary tract infection)     Time reflects when diagnosis was documented in both MDM as applicable and the Disposition within this note     Time User Action Codes Description Comment    2/15/2023 10:25 PM [employer] Add [T76 24] Alcoholic ketoacidosis     [facility]     2/15/2023 10:25 PM [employer] Add [N17 9] ONOFRE (acute kidney injury) ([facility] )     2/15/2023 10:25 PM [employer] Add [T07  XXXA] Abrasions of multiple sites     2/15/2023 11:00 PM Winifred Ocampo Add [N39 0] UTI (urinary tract infection)       ED Disposition     ED Disposition   Discharge    Condition   Stable    Date/Time   Wed Feb 15, 2023 10:49 PM    Comment   Delphia Moment discharge to home/self care  Follow-up Information    None       Discharge Medication List as of 2/15/2023 10:51 PM      CONTINUE these medications which have NOT CHANGED    Details   Buprenorphine HCl-Naloxone HCl 11 4-2 9 MG SUBL Place 1 tablet under the tongue daily, Starting Mon 10/10/2022, Until Wed 11/9/2022, Normal           No discharge procedures on file         ED Provider  Electronically Signed by     Jackson Salazar PA-C  02/16/23 6143

## 2023-02-21 ENCOUNTER — HOSPITAL ENCOUNTER (EMERGENCY)
Facility: HOSPITAL | Age: 48
Discharge: HOME | End: 2023-02-21
Attending: EMERGENCY MEDICINE

## 2023-02-21 VITALS — HEART RATE: 133 BPM | RESPIRATION RATE: 18 BRPM | OXYGEN SATURATION: 97 %

## 2023-02-21 DIAGNOSIS — T40.1X1A HEROIN OVERDOSE, ACCIDENTAL OR UNINTENTIONAL, INITIAL ENCOUNTER (HCC): Primary | ICD-10-CM

## 2023-02-21 RX ORDER — NALOXONE HYDROCHLORIDE 1 MG/ML
INJECTION INTRAMUSCULAR; INTRAVENOUS; SUBCUTANEOUS
Status: COMPLETED
Start: 2023-02-21 | End: 2023-02-21

## 2023-02-21 NOTE — ED PROVIDER NOTES
HPI: Patient is a 57 y o  male who presents with 1 days of drug overdose  which the patient describes at moderate The patient has been given 4mg IN narcan  Patient reports he used heroin laced with fentanyl intranasal Heroin  Daily use of opiates  This was not an intentional overdose but recreational no plans or thoughts of suicide  Patient adamantly declines rehab options  Social History     Substance and Sexual Activity   Drug Use Yes   • Frequency: 7 0 times per week   • Types: Heroin    Comment: about 2 bags      Nursing notes reviewed  Physical Exam:  ED Triage Vitals [02/21/23 0950]   Temp Pulse Respirations BP SpO2   -- (!) 133 18 -- 97 %      Temp src Heart Rate Source Patient Position - Orthostatic VS BP Location FiO2 (%)   -- Monitor Sitting Left arm --      Pain Score       --           ROS: Positive for none, the remainder of a 10 organ system ROS was otherwise unremarkable  General: awake, alert, no acute distress  Head: normocephalic, atraumatic  Eyes: no scleral icterus - abrasion next to left eyebrow patient reports this was from earlier today when he clogged a sink and abraded his face off the wall  Ears: external ears normal, hearing grossly intact  Nose: external exam grossly normal, Negative nasal discharge  Neck: symmetric, No JVD noted, trachea midline  Pulmonary: no respiratory distress, no tachypnea noted  Cardiovascular: appears well perfused  Abdomen: no distention noted  Musculoskeletal: no deformities noted, tone normal  Neuro: grossly non-focal  Psych: mood and affect appropriate      Discussed with patient about their substance abuse, offered rehabilitation services to the patient including host referral and warm handoff  Patient declines at this point time  Counseled on the importance sobriety and following up as outpatient  Patient was observed in the emergency department after receiving pre-hospital naloxone    The patient was medically stable for discharge  Patient left the ER after provider exam     Procedure  Procedures       Pt re-examined and evaluated after testing and treatment  Spoke with the patient upon arrival and reports she is feeling improved and sxs have resolved  Patient Advised to return for worsening or additional problems  Diagnostic tests were reviewed and questions answered  Diagnosis, care plan and treatment options were discussed  The patient understand instructions and will follow up as directed  Medications   naloxone (NARCAN) 2 MG/2ML injection **ADS Override Pull** (  Given to EMS 2/21/23 7719)     Final diagnoses:   Heroin overdose, accidental or unintentional, initial encounter [facility])     Time reflects when diagnosis was documented in both MDM as applicable and the Disposition within this note     Time User Action Codes Description Comment    2/21/2023  9:54 AM Kieran, [facility]  Heroin overdose, accidental or unintentional, initial encounter [facility])       ED Disposition     ED Disposition   Left from Room after Provider Exam    Condition   --    Date/Time   Tue Feb 21, 2023  9:54 AM    Comment   --         Follow-up Information    None       Discharge Medication List as of 2/21/2023  9:54 AM      CONTINUE these medications which have NOT CHANGED    Details   Buprenorphine HCl-Naloxone HCl 11 4-2 9 MG SUBL Place 1 tablet under the tongue daily, Starting Mon 10/10/2022, Until Wed 11/9/2022, Normal         STOP taking these medications       cephalexin (KEFLEX) 500 mg capsule Comments:   Reason for Stopping:             No discharge procedures on file      Electronically Signed by         Gracie Walker PA-C  02/21/23 8549

## 2023-02-21 NOTE — ED NOTES
Pt seen walking out of room and left ED  Gait steady, no distress noted   Provider aware     Aislinn Jenkins RN  02/21/23 0887

## 2023-02-23 ENCOUNTER — HOSPITAL ENCOUNTER (EMERGENCY)
Facility: HOSPITAL | Age: 48
Discharge: HOME | End: 2023-02-23
Attending: EMERGENCY MEDICINE

## 2023-02-23 VITALS
HEART RATE: 123 BPM | DIASTOLIC BLOOD PRESSURE: 82 MMHG | TEMPERATURE: 98.1 F | RESPIRATION RATE: 16 BRPM | OXYGEN SATURATION: 96 % | SYSTOLIC BLOOD PRESSURE: 137 MMHG

## 2023-02-23 DIAGNOSIS — T40.1X1A HEROIN OVERDOSE, ACCIDENTAL OR UNINTENTIONAL, INITIAL ENCOUNTER (HCC): Primary | ICD-10-CM

## 2023-02-23 RX ORDER — NALOXONE HYDROCHLORIDE 1 MG/ML
2 INJECTION INTRAMUSCULAR; INTRAVENOUS; SUBCUTANEOUS ONCE
Status: COMPLETED | OUTPATIENT
Start: 2023-02-23 | End: 2023-02-23

## 2023-02-23 RX ADMIN — NALOXONE HYDROCHLORIDE 2 MG: 1 INJECTION PARENTERAL at 11:46

## 2023-02-23 RX ADMIN — NALOXONE HYDROCHLORIDE 4 MG: 4 SPRAY NASAL at 12:00

## 2023-02-23 NOTE — ED PROVIDER NOTES
History  Chief Complaint   Patient presents with   • Heroin Overdose - Accidental     Patient was brought in by ems after apd found patient sitting on the ground by the parking garage  Patient admitted to injecting a half bag of heroin today  Denies wanting detox  No medications given by ems     22-year-old male past medical history of hepatitis C, polysubstance abuse presents to ED for possible heroin overdose via EMS  No narcan given  He states he remembers what happened, admits to injecting half a bag of heroin  Denies other drug or alcohol use  Denies any CP, SOB, palpitations, confusion, falls, fever, chills, SI/HI  History provided by:  Patient and EMS personnel      Prior to Admission Medications   Prescriptions Last Dose Informant Patient Reported? Taking? Buprenorphine HCl-Naloxone HCl 11 4-2 9 MG SUBL   No No   Sig: Place 1 tablet under the tongue daily      Facility-Administered Medications: None       Past Medical History:   Diagnosis Date   • Hepatitis C        History reviewed  No pertinent surgical history  Family History   Problem Relation Age of Onset   • Alcohol abuse Father    • Cirrhosis Father      I have reviewed and agree with the history as documented  E-Cigarette/Vaping     E-Cigarette/Vaping Substances     Social History     Tobacco Use   • Smoking status: Every Day     Packs/day: 1 00     Types: Cigarettes   • Smokeless tobacco: Never   Substance Use Topics   • Alcohol use: Yes     Comment: social   • Drug use: Yes     Frequency: 7 0 times per week     Types: Heroin     Comment: about 2 bags       Review of Systems   Constitutional: Negative for chills and fever  HENT: Negative for congestion, rhinorrhea and sore throat  Eyes: Negative for visual disturbance  Respiratory: Negative for chest tightness and shortness of breath  Cardiovascular: Negative for chest pain and palpitations  Skin: Negative for color change and rash     Neurological: Negative for dizziness, syncope, weakness and numbness  Psychiatric/Behavioral: Negative for confusion and suicidal ideas  All other systems reviewed and are negative  Physical Exam  Physical Exam  Vitals and nursing note reviewed  Constitutional:       General: He is not in acute distress  Appearance: Normal appearance  He is not toxic-appearing or diaphoretic  HENT:      Head: Normocephalic and atraumatic  No raccoon eyes, Adkins's sign or contusion  Mouth/Throat:      Mouth: Mucous membranes are moist    Eyes:      Extraocular Movements: Extraocular movements intact  Pupils: Pupils are equal, round, and reactive to light  Cardiovascular:      Rate and Rhythm: Regular rhythm  Tachycardia present  Heart sounds: Normal heart sounds  Pulmonary:      Effort: Pulmonary effort is normal  No tachypnea, bradypnea, accessory muscle usage, respiratory distress or retractions  Breath sounds: No stridor or decreased air movement  No decreased breath sounds  Abdominal:      General: There is no distension  Palpations: Abdomen is soft  Skin:     General: Skin is warm and dry  Neurological:      Mental Status: He is alert and oriented to person, place, and time  GCS: GCS eye subscore is 4  GCS verbal subscore is 5  GCS motor subscore is 6  Gait: Gait normal    Psychiatric:         Behavior: Behavior is agitated  Behavior is cooperative  Thought Content: Thought content does not include suicidal ideation           Vital Signs  ED Triage Vitals [02/23/23 1142]   Temperature Pulse Respirations Blood Pressure SpO2   98 1 °F (36 7 °C) (!) 138 16 143/83 94 %      Temp Source Heart Rate Source Patient Position - Orthostatic VS BP Location FiO2 (%)   Oral Monitor Lying Left arm --      Pain Score       --           Vitals:    02/23/23 1142 02/23/23 1157   BP: 143/83 137/82   Pulse: (!) 138 (!) 123   Patient Position - Orthostatic VS: Lying Lying         Visual Acuity  Visual Acuity Flowsheet Row Most Recent Value   L Pupil Size (mm) 2   R Pupil Size (mm) 2          ED Medications  Medications   naloxone (NARCAN) intranasal 2 mg (2 mg Nasal Given 2/23/23 1146)   naloxone nasal- Given to patient by provider at discharge  (NARCAN) 4 mg/0 1 mL nasal spray 4 mg (4 mg Does not apply Given by Other 2/23/23 1200)       Diagnostic Studies  Results Reviewed     None                 No orders to display              Procedures  Procedures         ED Course  ED Course as of 02/23/23 1434   Thu Feb 23, 2023   1145 Per EMS was found by police next to parking garage, is homeless, was not responding to their questions  was on 4L of oxygen due to pulse ox of 90%  Was easily aroused to voice  No Narcan given  1147 Admits to injecting a half bag of heroin PTA  Declines detox/host services  AAOx 4  Pinpoint pupils noted, desaturates to 90% upon falling asleep, easily aroused verbally  Respirations not decreased  Is not requiring oxygen  Due to history, oxygen requirement with EMS, desaturation with sleep  Will give dose of intranasal narcan  1200 Patient became agitated, requesting to leave  Since narcan administration, no dozing, no desaturations  Remains AAOx4  Ambulates with steady gait  Refuses to stay in ED or additional workup  Pacing around bed, saying he is about to leave  Became increasingly more agitated when it was explained we would like to monitor him for the full observation period following narcan administration and the benefits of it as well as risks of not completing observation period  Agreeable to signing out AMA, able to demonstrate understanding of risks, willing to accept community narcan  Medical Decision Making  Brought to ED for heroin use by EMS  No narcan given  Admits to heroin use  GCS 15, AAOx4 upon arrival  No acute respiratory distress  Pinpoint pupils  Episodes of desaturation when sleeping   Easily aroused by calling his name, pulse ox returns to 96% and above  Dose of 2 mg intranasal narcan given  Plan to continue observation  No further episodes  Patient became agitated, refused to stay for full observation period  Declined Detox/Host  Amanda Bodily community narcan  No SI/HI  Ambulates with steady gait  No lethargy or respiratory distress  AAOx4  Cala Lesch insists on leaving against medical advice, despite my recommendation to remain for ongoing treatment  1: Capacity: I have determined that the patient has capacity to make the decision to leave against medical advice based on the following:   A  Ability to express a choice: The patient is able to express his or her choice and communicate that choice  Antionette Listen to understand relevant information: The patient is able to verbalize their diagnosis, understand information about the purpose of treatment, remember the information, and show that he or she can be part of the decision-making process  Jones Diss to appreciate the significance of the information and its consequences: The patient understands the consequences of treatment refusal and the risks and benefits of accepting or refusing treatment  D  Ability to manipulate information: The patient is able to engage in reasoning as it applies to making treatment decisions   2: Psychiatric Consultation: There is not an indication to call psychiatry consultation to determine capacity   3  Alternative Treatment: I have discussed the recommended course of treatment and available alternatives  4  Risks: I have discussed the specific risks of that patient refusing treatment    5  Follow-up Care: I have discussed the follow-up care and advised to see PCP immediately   6  ED Option: I have emphasized that the patient has the option to return to the ED      Heroin overdose, accidental or unintentional, initial encounter [facility]): acute illness or injury  Risk  Prescription drug management            Disposition  Final diagnoses:   Heroin overdose, accidental or unintentional, initial encounter [facility])     Time reflects when diagnosis was documented in both MDM as applicable and the Disposition within this note     Time User Action Codes Description Comment    2/23/2023 11:59 AM Pio Goldberg [place] Heroin overdose, accidental or unintentional, initial encounter [facility])       ED Disposition     ED Disposition   AMA    Condition   --    Date/Time   Thu Feb 23, 2023 12:00 PM    Comment   Date: 2/23/2023  Patient: Sugar Longoria  Admitted: 2/23/2023 11:41 AM  Attending Provider: Anthony Pollock, *    Sugar Longoria or his authorized caregiver has made the decision for the patient to leave the emergency department against the advi ce of the emergency department staff  He or his authorized caregiver has been informed and understands the inherent risks, including death, morbidity, respiratory distress, respiratory failure, brain injury, infection, other organ damage or failure  He or his authorized caregiver has decided to accept the responsibility for this decision  Sugar Longoria and all necessary parties have been advised that he may return for further evaluation or treatment  His condition at time of discharge was stabl e    Sugar Longoria had current vital signs as follows:  /82 (BP Location: Left arm)   Pulse (!) 123   Temp 98 1 °F (36 7 °C) (Oral)   Resp 16            Follow-up Information     Follow up With Specialties Details Why Contact Info Additional [address] [facility] Schedule an appointment as soon as possible for a visit  For follow up regarding your symptoms [address], [address] [facility] [address]  [address] [facility] [address], [address], [address] [place], [place], [address] [facility] Emergency Department Emergency Medicine   [address] [facility] [address]  [address] [facility] Emergency Department          Discharge Medication List as of 2/23/2023 12:02 PM      CONTINUE these medications which have NOT CHANGED    Details   Buprenorphine HCl-Naloxone HCl 11 4-2 9 MG SUBL Place 1 tablet under the tongue daily, Starting Mon 10/10/2022, Until Wed 11/9/2022, Normal             No discharge procedures on file      PDMP Review     None          ED Provider  Electronically Signed by           Teresa Harley PA-C  02/23/23 7361

## 2023-03-03 ENCOUNTER — HOSPITAL ENCOUNTER (EMERGENCY)
Facility: HOSPITAL | Age: 48
Discharge: HOME | End: 2023-03-03
Attending: EMERGENCY MEDICINE

## 2023-03-03 VITALS
DIASTOLIC BLOOD PRESSURE: 99 MMHG | HEART RATE: 116 BPM | TEMPERATURE: 98.3 F | BODY MASS INDEX: 25.53 KG/M2 | RESPIRATION RATE: 20 BRPM | OXYGEN SATURATION: 99 % | WEIGHT: 188.27 LBS | SYSTOLIC BLOOD PRESSURE: 150 MMHG

## 2023-03-03 DIAGNOSIS — T40.1X1A HEROIN OVERDOSE (HCC): Primary | ICD-10-CM

## 2023-03-04 NOTE — ED PROVIDER NOTES
History  Chief Complaint   Patient presents with   • Overdose - Accidental     Patient is a 51-year-old male who presents via [facility] after an accute overdose  Well known drug abuser  Found in street by [employer]  Given 4 mg of IN narcan with resolution of symptoms  No complaints at this time  States only used 1 bag IV  No SI with use  Prior to Admission Medications   Prescriptions Last Dose Informant Patient Reported? Taking? Buprenorphine HCl-Naloxone HCl 11 4-2 9 MG SUBL   No No   Sig: Place 1 tablet under the tongue daily      Facility-Administered Medications: None       Past Medical History:   Diagnosis Date   • Hepatitis C        History reviewed  No pertinent surgical history  Family History   Problem Relation Age of Onset   • Alcohol abuse Father    • Cirrhosis Father      I have reviewed and agree with the history as documented  E-Cigarette/Vaping     E-Cigarette/Vaping Substances     Social History     Tobacco Use   • Smoking status: Every Day     Packs/day: 1 00     Types: Cigarettes   • Smokeless tobacco: Never   Substance Use Topics   • Alcohol use: Yes     Comment: social   • Drug use: Yes     Frequency: 7 0 times per week     Types: Heroin     Comment: about 2 bags       Review of Systems   Constitutional: Positive for activity change  HENT: Negative  Eyes: Negative  Respiratory: Negative  Cardiovascular: Negative  Gastrointestinal: Negative  Endocrine: Negative  Genitourinary: Negative  Musculoskeletal: Negative  Skin: Negative  Allergic/Immunologic: Negative  Neurological: Negative  Hematological: Negative  Psychiatric/Behavioral: Negative  All other systems reviewed and are negative  Physical Exam  Physical Exam  Vitals and nursing note reviewed  Constitutional:       Comments: Disheveled  HENT:      Head: Normocephalic and atraumatic        Nose: Nose normal       Mouth/Throat:      Mouth: Mucous membranes are moist       Pharynx: Oropharynx is clear  Cardiovascular:      Rate and Rhythm: Regular rhythm  Tachycardia present  Pulses: Normal pulses  Heart sounds: Normal heart sounds  Pulmonary:      Effort: Pulmonary effort is normal       Breath sounds: Normal breath sounds  Abdominal:      General: Bowel sounds are normal       Palpations: Abdomen is soft  Musculoskeletal:      Cervical back: Normal range of motion  Skin:     General: Skin is warm  Capillary Refill: Capillary refill takes less than 2 seconds  Comments: Scabs over dorsum of left fingers  Neurological:      General: No focal deficit present  Cranial Nerves: No cranial nerve deficit  Motor: No weakness  Gait: Gait normal    Psychiatric:         Mood and Affect: Mood normal          Behavior: Behavior normal          Vital Signs  ED Triage Vitals [03/03/23 1925]   Temperature Pulse Respirations Blood Pressure SpO2   98 3 °F (36 8 °C) (!) 116 20 150/99 99 %      Temp Source Heart Rate Source Patient Position - Orthostatic VS BP Location FiO2 (%)   Oral Monitor Sitting Left arm --      Pain Score       No Pain           Vitals:    03/03/23 1925   BP: 150/99   Pulse: (!) 116   Patient Position - Orthostatic VS: Sitting         Visual Acuity      ED Medications  Medications - No data to display    Diagnostic Studies  Results Reviewed     None                 No orders to display              Procedures  Procedures         ED Course  ED Course as of 03/03/23 2000   Fri Mar 03, 2023   1957 Patient awake alert and oriented  No clinical signs of opiate toxidrome  Medical Decision Making  Heroin overdose [facility]): acute illness or injury     Details: ongoing issue  resolved with  narcan  no complaints here  no signs of intoxication at [address]  Amount and/or Complexity of Data Reviewed  Independent Historian: EMS  External Data Reviewed: notes  Details: previous ER visits  Disposition  Final diagnoses:   Heroin overdose ([facility] [address] )     Time reflects when diagnosis was documented in both MDM as applicable and the Disposition within this note     Time User Action Codes Description Comment    3/3/2023  7:26 PM Addy [address] Heroin overdose [facility])       ED Disposition     ED Disposition   Discharge    Condition   Stable    Date/Time   Fri Mar 3, 2023  7:57 PM    Comment   Rashaad Laguerre discharge to home/self care  Follow-up Information     Follow up With Specialties Details Why Contact Info Additional [address]   [address], [address] [facility] [address]  [address], [address], [address] [place], [place], [address]          Current Discharge Medication List      CONTINUE these medications which have NOT CHANGED    Details   Buprenorphine HCl-Naloxone HCl 11 4-2 9 MG SUBL Place 1 tablet under the tongue daily  Qty: 30 tablet, Refills: 0    Comments: PTOTME:[id number]  Associated Diagnoses: Opioid use disorder, moderate, dependence ([facility] )             No discharge procedures on file      PDMP Review     None          ED Provider  Electronically Signed by           Francisco Michaels MD  03/03/23 1930       Francisco Michaels MD  03/03/23 2000

## 2023-04-21 ENCOUNTER — HOSPITAL ENCOUNTER (EMERGENCY)
Facility: HOSPITAL | Age: 48
Discharge: HOME | End: 2023-04-21
Attending: INTERNAL MEDICINE | Admitting: INTERNAL MEDICINE

## 2023-04-21 VITALS
SYSTOLIC BLOOD PRESSURE: 115 MMHG | RESPIRATION RATE: 20 BRPM | DIASTOLIC BLOOD PRESSURE: 61 MMHG | OXYGEN SATURATION: 96 % | TEMPERATURE: 98.3 F | HEART RATE: 115 BPM

## 2023-04-21 DIAGNOSIS — F19.90 RECREATIONAL DRUG USE: Primary | ICD-10-CM

## 2023-04-21 RX ADMIN — NALOXONE HYDROCHLORIDE 4 MG: 4 SPRAY NASAL at 11:22

## 2023-04-21 NOTE — CERTIFIED RECOVERY SPECIALIST
Certified  Note    Patient name: Penny Fay  [facility] [address]  Attending:  Kandis Holbrook MD MRN [id number]  : 1975  Age: 57 y o      Sex: male Date 2023         Substance Use History:     Social History     Substance and Sexual Activity   Alcohol Use Yes    Comment: social        Social History     Substance and Sexual Activity   Drug Use Yes    Frequency: 7 0 times per week    Types: Heroin    Comment: about 2 bags     Patient sleeping, resources provided    [employer]

## 2023-04-21 NOTE — ED NOTES
"Pt stated \"I thought I was doing heroin\" pt restless, unable to sit still, constantly making noise/squeaking sounds    Somewhat redirectable for brief time but then continues making nonsense noises     Kina Monsivais RN  04/21/23 8520    "

## 2023-04-21 NOTE — ED NOTES
Pt woke up, alert and oriented, gait steady, no complaints, no distress noted         Adolfo Harding RN  04/21/23 2165

## 2023-04-21 NOTE — ED PROVIDER NOTES
History  Chief Complaint   Patient presents with    Addiction Problem     911 called by concerned citizen after finding pt rolling around in the grass yelling    HPI  53-year-old man presents to ED for evaluation of recreational drug use  911 was called after a bystander witnessed the patient rolling around and yelling on the grass  Per EMS, patient was not given any Narcan  Patient reports he used IV heroin  He denies any other drugs  Patient denies any complaints at all  He denies chest pain, shortness of breath or abdominal pain  He states he would like a ginger ale with ice and he would like to relax  Patient denies any SI or HI  He denies any auditory or visual hallucinations  He is not interested in detox or rehab at this time    Prior to Admission Medications   Prescriptions Last Dose Informant Patient Reported? Taking? Buprenorphine HCl-Naloxone HCl 11 4-2 9 MG SUBL   No No   Sig: Place 1 tablet under the tongue daily      Facility-Administered Medications: None       Past Medical History:   Diagnosis Date    Drug abuse ([facility] [address] )     Hepatitis C        History reviewed  No pertinent surgical history  Family History   Problem Relation Age of Onset    Alcohol abuse Father     Cirrhosis Father      I have reviewed and agree with the history as documented  E-Cigarette/Vaping     E-Cigarette/Vaping Substances     Social History     Tobacco Use    Smoking status: Every Day     Packs/day: 1 00     Types: Cigarettes    Smokeless tobacco: Never   Substance Use Topics    Alcohol use: Yes     Comment: social    Drug use: Yes     Frequency: 7 0 times per week     Types: Heroin     Comment: about 2 bags       Review of Systems   All other systems reviewed and are negative        Physical Exam  Physical Exam   PHYSICAL EXAM    Constitutional:  Well developed, no acute distress  HEENT:  Conjunctiva normal  Oropharynx moist  Respiratory:  No respiratory distress, lungs clear to auscultation bilaterally  Cardiovascular: Tachycardic, no murmurs  GI:  Soft, nondistended, nontender  :  No costovertebral angle tenderness   Musculoskeletal:  No edema, no tenderness, no deformities  Integument:  Well hydrated, no rash   Lymphatic:  No lymphadenopathy noted   Neurologic:  Alert & oriented x 3, normal motor function, no focal deficits noted   Psychiatric:  Speech and behavior appropriate       Vital Signs  ED Triage Vitals [04/21/23 1015]   Temperature Pulse Respirations Blood Pressure SpO2   98 3 °F (36 8 °C) (!) 115 20 115/61 96 %      Temp Source Heart Rate Source Patient Position - Orthostatic VS BP Location FiO2 (%)   Oral -- Lying Left arm --      Pain Score       --           Vitals:    04/21/23 1015   BP: 115/61   Pulse: (!) 115   Patient Position - Orthostatic VS: Lying         Visual Acuity      ED Medications  Medications   naloxone nasal- Given to patient by provider at discharge  (NARCAN) 4 mg/0 1 mL nasal spray 4 mg (4 mg Does not apply Given by Other 4/21/23 1122)       Diagnostic Studies  Results Reviewed     None                 No orders to display              Procedures  Procedures         ED Course                                             Medical Decision Making  44-year-old man presents to ED for evaluation of recreational drug use  Patient not require Narcan  We will observe him in the ED  Patient denies any SI or HI  He denies any auditory or visual hallucinations  He is not interested in detox or rehab at this time  Community Narcan was provided to the patient by me prior to his discharge and he was instructed on its use    Recreational drug use: acute illness or injury  Risk  Prescription drug management            Disposition  Final diagnoses:   Recreational drug use     Time reflects when diagnosis was documented in both MDM as applicable and the Disposition within this note     Time User Action Codes Description Comment    4/21/2023 11:18 AM Angela Mendes Add [F19 90] Recreational drug use       ED Disposition     ED Disposition   Discharge    Condition   Stable    Date/Time   Fri Apr 21, 2023 11:18 AM    Comment   Tori Rahmanadrien discharge to home/self care  Follow-up Information    None         Discharge Medication List as of 4/21/2023 11:19 AM      CONTINUE these medications which have NOT CHANGED    Details   Buprenorphine HCl-Naloxone HCl 11 4-2 9 MG SUBL Place 1 tablet under the tongue daily, Starting Mon 10/10/2022, Until Wed 11/9/2022, Normal             No discharge procedures on file      PDMP Review     None          ED Provider  Electronically Signed by           Sid Gonsales MD  04/21/23 6825

## 2023-04-21 NOTE — ED NOTES
Pt not sleeping  No distress noted  Respirations even/unlabored    Will continue to monitor     Claudeen Harpin, RN  04/21/23 2892

## 2023-05-24 ENCOUNTER — OFFICE VISIT (OUTPATIENT)
Dept: FAMILY MEDICINE CLINIC | Facility: CLINIC | Age: 48
End: 2023-05-24

## 2023-05-24 VITALS
BODY MASS INDEX: 19.26 KG/M2 | WEIGHT: 142 LBS | HEART RATE: 94 BPM | RESPIRATION RATE: 16 BRPM | DIASTOLIC BLOOD PRESSURE: 80 MMHG | TEMPERATURE: 98.3 F | SYSTOLIC BLOOD PRESSURE: 146 MMHG | OXYGEN SATURATION: 94 %

## 2023-05-24 DIAGNOSIS — B37.9 YEAST INFECTION: Primary | ICD-10-CM

## 2023-05-24 RX ORDER — LIDOCAINE HYDROCHLORIDE 20 MG/ML
15 SOLUTION OROPHARYNGEAL 4 TIMES DAILY PRN
Qty: 100 ML | Refills: 0 | Status: SHIPPED | OUTPATIENT
Start: 2023-05-24

## 2023-05-24 RX ORDER — NYSTATIN 100000 U/G
OINTMENT TOPICAL 2 TIMES DAILY
Qty: 30 G | Refills: 0 | Status: SHIPPED | OUTPATIENT
Start: 2023-05-24

## 2023-05-24 NOTE — PROGRESS NOTES
Name: London Billy      : 1975      MRN: [id number]  Encounter Provider: Pelon Medina MD  Encounter Date: 2023   Encounter department: [address] [place] [address]     1  Yeast infection  -     Lidocaine Viscous HCl (XYLOCAINE) 2 % mucosal solution; Swish and spit 15 mL 4 (four) times a day as needed for mouth pain or discomfort  -     nystatin (MYCOSTATIN) 500,000 units/5 mL suspension; Apply 5 mL (500,000 Units total) to the mouth or throat 4 (four) times a day  -     nystatin (MYCOSTATIN) ointment; Apply topically 2 (two) times a day          Avoid touching/picking at your feet  Keep feet clean  Patient should make an appointment to establish care  Patient Hep C positive, lasted tested 10/2022  Prior labs reviewed and discussed with patient and nurse  Given elevated LFTs not a candidate for oral antifungal treatment   Subjective      Same Day Visit   58 yo male new patient to the office, with known history of polysubstance abuse, Hep C, homelessness, brought in today by [facility] nurse for cracks on his feet and painful tongue lesions  Patient walks wears shoes that are very tight or walks barefoot  Picks at the lesions on his feet and does not have how to clean/sanatize his hands and often picks at his tongue  Review of Systems   HENT:        Painful tongue   All other systems reviewed and are negative  Current Outpatient Medications on File Prior to Visit   Medication Sig   • Buprenorphine HCl-Naloxone HCl 11 4-2 9 MG SUBL Place 1 tablet under the tongue daily       Objective     /80 (BP Location: Left arm, Patient Position: Sitting, Cuff Size: Large)   Pulse 94   Temp 98 3 °F (36 8 °C) (Skin)   Resp 16   Wt 64 4 kg (142 lb)   SpO2 94%   BMI 19 26 kg/m²     Physical Exam  Vitals and nursing note reviewed  Constitutional:       Appearance: He is well-developed  HENT:      Head: Normocephalic        Nose: Nose normal  Mouth/Throat:      Dentition: Abnormal dentition  Gingival swelling and dental caries present  Tongue: Lesions present  Comments: Thick white/gray plaques on tongue which can not be scrapped off  Eyes:      Conjunctiva/sclera: Conjunctivae normal       Pupils: Pupils are equal, round, and reactive to light  Neck:      Thyroid: No thyromegaly  Cardiovascular:      Rate and Rhythm: Normal rate and regular rhythm  Heart sounds: Normal heart sounds  Pulmonary:      Effort: Pulmonary effort is normal       Breath sounds: Normal breath sounds  Abdominal:      Palpations: Abdomen is soft  Tenderness: There is no abdominal tenderness  There is no guarding or rebound  Feet:      Right foot:      Skin integrity: Blister, skin breakdown, callus, dry skin and fissure present  Toenail Condition: Right toenails are abnormally thick  Fungal disease present  Left foot:      Skin integrity: Blister, skin breakdown, erythema, callus, dry skin and fissure present  Toenail Condition: Left toenails are abnormally thick  Fungal disease present  Skin:     General: Skin is dry  Comments: Multiple areas of cracked skin on both feet  Thick irregular scaly patches on soles of feet and web spaces     Neurological:      Mental Status: He is alert and oriented to person, place, and time  Deep Tendon Reflexes: Reflexes are normal and symmetric         Ayan Shetty MD